# Patient Record
Sex: FEMALE | Race: WHITE | NOT HISPANIC OR LATINO | Employment: STUDENT | URBAN - METROPOLITAN AREA
[De-identification: names, ages, dates, MRNs, and addresses within clinical notes are randomized per-mention and may not be internally consistent; named-entity substitution may affect disease eponyms.]

---
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
## 2021-01-13 ENCOUNTER — TRANSCRIBE ORDERS (OUTPATIENT)
Dept: ADMINISTRATIVE | Facility: HOSPITAL | Age: 21
End: 2021-01-13

## 2021-01-13 DIAGNOSIS — M79.89 MASS OF SOFT TISSUE OF SHOULDER: Primary | ICD-10-CM

## 2021-01-15 ENCOUNTER — HOSPITAL ENCOUNTER (OUTPATIENT)
Dept: ULTRASOUND IMAGING | Facility: HOSPITAL | Age: 21
Discharge: HOME | End: 2021-01-15
Admitting: PEDIATRICS

## 2021-01-15 DIAGNOSIS — M79.89 MASS OF SOFT TISSUE OF SHOULDER: ICD-10-CM

## 2021-01-15 PROCEDURE — 76882 US LMTD JT/FCL EVL NVASC XTR: CPT

## 2022-12-28 ENCOUNTER — OFFICE VISIT (OUTPATIENT)
Dept: INTERNAL MEDICINE | Facility: CLINIC | Age: 22
End: 2022-12-28

## 2022-12-28 VITALS
DIASTOLIC BLOOD PRESSURE: 62 MMHG | HEART RATE: 112 BPM | OXYGEN SATURATION: 97 % | SYSTOLIC BLOOD PRESSURE: 106 MMHG | TEMPERATURE: 98 F | WEIGHT: 116 LBS | BODY MASS INDEX: 19.33 KG/M2 | HEIGHT: 65 IN

## 2022-12-28 DIAGNOSIS — R14.0 ABDOMINAL BLOATING: ICD-10-CM

## 2022-12-28 DIAGNOSIS — N92.0 MENORRHAGIA WITH REGULAR CYCLE: ICD-10-CM

## 2022-12-28 DIAGNOSIS — H72.91 PERFORATION OF RIGHT TYMPANIC MEMBRANE: ICD-10-CM

## 2022-12-28 DIAGNOSIS — K59.00 CONSTIPATION, UNSPECIFIED CONSTIPATION TYPE: ICD-10-CM

## 2022-12-28 DIAGNOSIS — N83.209 CYST OF OVARY, UNSPECIFIED LATERALITY: ICD-10-CM

## 2022-12-28 DIAGNOSIS — H93.19 TINNITUS, UNSPECIFIED LATERALITY: ICD-10-CM

## 2022-12-28 DIAGNOSIS — Z00.00 HEALTH CARE MAINTENANCE: Primary | ICD-10-CM

## 2022-12-28 PROCEDURE — 99204 OFFICE O/P NEW MOD 45 MIN: CPT | Performed by: NURSE PRACTITIONER

## 2022-12-28 RX ORDER — SERTRALINE HYDROCHLORIDE 100 MG/1
100 TABLET, FILM COATED ORAL DAILY
COMMUNITY
Start: 2022-12-11

## 2022-12-28 NOTE — PATIENT INSTRUCTIONS
1. Preventative counseling- continue to work on healthy diet and exercise  2. Ovarian cysts- discuss this and ?endometriosis with GYN.   3. Tinnitus/TM perforation- see ENT  4. Constipation/Abd bloating- check celiac panel, start probiotic and start GI     Fasting labs in 1-2 weeks  She will look to see who is in network in [place] for ENT and GI referrals.
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
## 2023-01-24 ENCOUNTER — TELEPHONE (OUTPATIENT)
Dept: INTERNAL MEDICINE | Facility: CLINIC | Age: 23
End: 2023-01-24

## 2023-01-24 NOTE — TELEPHONE ENCOUNTER
Caller: Mairja Hanson    Relationship: Self    Best call back number: [phone]    What specialty or service is being requested: GASTROENTEROLOGY, ENT    What is the provider, practice or medical service name: PATIENT STATED SHE HAS     Any additional details: PATIENT STATED SHE HAS DISCUSSED IN NETWORK PROVIDER WITH HER INSURANCE, AND HAS 3 NAMES FOR EACH SPECIALTY OF PROVIDERS  SHE COULD BE REFERRED TO.    PATIENT IS REQUESTING TO KNOW IF SHE SHOULD HAVE BLOODWORK DONE PRIOR TO MEETING WITH A SPECIALIST.    PATIENT IS REQUESTING A CALL TO DISCUSS THIS WITH MAKENNA CASH.    PLEASE CALL TO DISCUSS

## 2023-02-13 ENCOUNTER — TELEPHONE (OUTPATIENT)
Dept: INTERNAL MEDICINE | Facility: CLINIC | Age: 23
End: 2023-02-13
Payer: COMMERCIAL

## 2023-02-13 DIAGNOSIS — H93.19 TINNITUS, UNSPECIFIED LATERALITY: Primary | ICD-10-CM

## 2023-02-13 DIAGNOSIS — K59.00 CONSTIPATION, UNSPECIFIED CONSTIPATION TYPE: ICD-10-CM

## 2023-02-13 NOTE — TELEPHONE ENCOUNTER
I have sent both ref in ----- Message from ANNE Tan sent at 2/7/2023  5:09 PM EST -----  Regarding: RE: Referral  That's fine, please place referrals  ----- Message -----  From: Martha Keller  Sent: 2/7/2023   1:42 PM EST  To: ANNE Tan  Subject: Referral                                         Patient phoned to let you know who she wanted to be referred to ENT she would like to see Dr. Headley and for GI doctor Dr. Katherine Munguia those are who her insurance will pay for.

## 2023-03-03 ENCOUNTER — TELEPHONE (OUTPATIENT)
Dept: INTERNAL MEDICINE | Facility: CLINIC | Age: 23
End: 2023-03-03

## 2023-03-03 NOTE — TELEPHONE ENCOUNTER
Caller: Marija Hanson    Relationship: Self    Best call back number: [phone]     What is the medical concern/diagnosis: HOLE IN EAR DRUM    What specialty or service is being requested: ENT    What is the provider, practice or medical service name: ANY    What is the office location: ANY    What is the office phone number: ANY    Any additional details: PATIENT HAD A PREVIOUS REFERRAL, BUT THE PROVIDER'S OFFICE NO LONGER EXISTS.

## 2023-03-06 DIAGNOSIS — H93.19 TINNITUS, UNSPECIFIED LATERALITY: ICD-10-CM

## 2023-03-06 DIAGNOSIS — H72.91 PERFORATION OF RIGHT TYMPANIC MEMBRANE: Primary | ICD-10-CM

## 2023-05-15 ENCOUNTER — OFFICE VISIT (OUTPATIENT)
Dept: INTERNAL MEDICINE | Facility: CLINIC | Age: 23
End: 2023-05-15
Payer: COMMERCIAL

## 2023-05-15 ENCOUNTER — HOSPITAL ENCOUNTER (OUTPATIENT)
Dept: CT IMAGING | Facility: HOSPITAL | Age: 23
Discharge: HOME | End: 2023-05-15
Admitting: NURSE PRACTITIONER
Payer: COMMERCIAL

## 2023-05-15 VITALS
TEMPERATURE: 98 F | HEART RATE: 107 BPM | OXYGEN SATURATION: 100 % | SYSTOLIC BLOOD PRESSURE: 100 MMHG | BODY MASS INDEX: 19.16 KG/M2 | WEIGHT: 115 LBS | DIASTOLIC BLOOD PRESSURE: 76 MMHG | HEIGHT: 65 IN

## 2023-05-15 DIAGNOSIS — S06.0X0A CONCUSSION WITHOUT LOSS OF CONSCIOUSNESS, INITIAL ENCOUNTER: ICD-10-CM

## 2023-05-15 DIAGNOSIS — S06.0X0A CONCUSSION WITHOUT LOSS OF CONSCIOUSNESS, INITIAL ENCOUNTER: Primary | ICD-10-CM

## 2023-05-15 PROCEDURE — 70450 CT HEAD/BRAIN W/O DYE: CPT

## 2023-05-15 NOTE — PROGRESS NOTES
Subjective   Marija Hanson is a 23 y.o. female. Patient is here today for   Chief Complaint   Patient presents with   • Concussion     Patient complains of pain on top of head after falling    .    History of Present Illness   Patient hit her head 10 days ago. She tripped over her cat and hit her head on the sink. 3 days later, she was nauseous. She still has some nausea.   Hit the top of her head. She has a slight headache. She does have some trouble focusing on screens such as computer, phone.  Patient has had a concussion in the past.  She did not lose consciousness, but states that she was disoriented.    The following portions of the patient's history were reviewed and updated as appropriate: allergies, current medications, past family history, past medical history, past social history, past surgical history and problem list.    Review of Systems    Objective   Vitals:    05/15/23 0751   BP: 100/76   Pulse: 107   Temp: 98 °F (36.7 °C)   SpO2: 100%     Body mass index is 19.14 kg/m².  Physical Exam  Vitals and nursing note reviewed.   Constitutional:       Appearance: Normal appearance. She is well-developed.   HENT:      Right Ear: Tympanic membrane and ear canal normal.      Left Ear: Tympanic membrane and ear canal normal.   Eyes:      Pupils: Pupils are equal, round, and reactive to light.   Cardiovascular:      Rate and Rhythm: Normal rate and regular rhythm.      Heart sounds: Normal heart sounds.   Pulmonary:      Effort: Pulmonary effort is normal.      Breath sounds: Normal breath sounds.   Skin:     General: Skin is warm and dry.   Neurological:      Mental Status: She is alert and oriented to person, place, and time.   Psychiatric:         Speech: Speech normal.         Behavior: Behavior normal.         Thought Content: Thought content normal.         Assessment & Plan   Diagnoses and all orders for this visit:    1. Concussion without loss of consciousness, initial encounter (Primary)  -     CT  Head Without Contrast; Future      We will get a stat CT scan to rule out any bleed.  Patient lives in [place] and is here for just this week.  Apparently she drives down here for her health care due to her insurance.  Patient is also doing physical therapy for her neck and is needing to be released from her concussion to proceed with physical therapy.  States that she does physical therapy online

## 2023-06-06 DIAGNOSIS — K59.00 CONSTIPATION, UNSPECIFIED CONSTIPATION TYPE: ICD-10-CM

## 2023-06-06 DIAGNOSIS — R14.0 ABDOMINAL BLOATING: Primary | ICD-10-CM

## 2023-07-17 DIAGNOSIS — G56.03 CARPAL TUNNEL SYNDROME, BILATERAL UPPER LIMBS: Primary | ICD-10-CM

## 2023-07-17 DIAGNOSIS — G56.00 CARPAL TUNNEL SYNDROME: Primary | ICD-10-CM

## 2023-08-14 ENCOUNTER — OFFICE (AMBULATORY)
Dept: URBAN - METROPOLITAN AREA CLINIC 76 | Facility: CLINIC | Age: 23
End: 2023-08-14
Payer: COMMERCIAL

## 2023-08-14 VITALS
HEART RATE: 103 BPM | WEIGHT: 112 LBS | DIASTOLIC BLOOD PRESSURE: 57 MMHG | HEIGHT: 65 IN | SYSTOLIC BLOOD PRESSURE: 97 MMHG | OXYGEN SATURATION: 97 %

## 2023-08-14 DIAGNOSIS — K21.9 GASTRO-ESOPHAGEAL REFLUX DISEASE WITHOUT ESOPHAGITIS: ICD-10-CM

## 2023-08-14 DIAGNOSIS — R11.2 NAUSEA WITH VOMITING, UNSPECIFIED: ICD-10-CM

## 2023-08-14 PROCEDURE — 99204 OFFICE O/P NEW MOD 45 MIN: CPT | Performed by: INTERNAL MEDICINE

## 2023-08-14 RX ORDER — PANTOPRAZOLE SODIUM 40 MG/1
40 TABLET, DELAYED RELEASE ORAL
Qty: 90 | Refills: 4 | Status: ACTIVE
Start: 2023-08-14

## 2023-08-15 ENCOUNTER — HOSPITAL ENCOUNTER (OUTPATIENT)
Dept: NEUROLOGY | Age: 23
Discharge: HOME | End: 2023-08-15
Attending: ORTHOPAEDIC SURGERY

## 2023-08-15 DIAGNOSIS — G56.00 CARPAL TUNNEL SYNDROME: ICD-10-CM

## 2023-08-15 DIAGNOSIS — G56.00 CARPAL TUNNEL SYNDROME, UNSPECIFIED LATERALITY: ICD-10-CM

## 2023-08-15 PROCEDURE — 95886 MUSC TEST DONE W/N TEST COMP: CPT | Performed by: PSYCHIATRY & NEUROLOGY

## 2023-08-15 PROCEDURE — 95886 MUSC TEST DONE W/N TEST COMP: CPT

## 2023-08-15 PROCEDURE — 95913 NRV CNDJ TEST 13/> STUDIES: CPT

## 2023-08-15 PROCEDURE — 95913 NRV CNDJ TEST 13/> STUDIES: CPT | Performed by: PSYCHIATRY & NEUROLOGY

## 2023-08-18 ENCOUNTER — TELEPHONE (OUTPATIENT)
Dept: INTERNAL MEDICINE | Facility: CLINIC | Age: 23
End: 2023-08-18
Payer: COMMERCIAL

## 2023-08-18 NOTE — TELEPHONE ENCOUNTER
Called patient and let her know that I could not locate the allergy testing results in her chart. She stated that she does not remember where she had the testing done.

## 2023-08-18 NOTE — TELEPHONE ENCOUNTER
Caller: MATT MARTÍNEZ    Relationship: Mother    Best call back number: [phone]*    What was the call regarding: PATIENT'S MOTHER (NOT ON VERBAL), CALLING STATING THAT THE PATIENT HAS BEEN HAVING REFLUX AND NEEDS TO GET A COPY OF THE ALLERGY TESTING THAT MAKENNASASHA CASH HAD ORDERED FOR HER AROUND A YEAR AGO. MATT STATES THAT THE PATIENT LOOKED ON HER MYCHART AND WAS UNABLE TO LOCATE IT. THE PATENT NEEDS THE RESULTS TO TAKE TO HER GI SPECIALIST.    ADDRESS: [address] [place] [address]     PLEASE CALL PATIENT BACK TO ADVISE IF THIS CAN BE MAILED TO THE PATIENT ASAP, AS SHE HAS A SCHEDULED APPOINTMENT WITH HER GI SPECIALIST ON 8/29.  [phone], IF NO ANSWER, PLEASE LEAVE A DETAILED MESSAGE FOR THE PATIENT.    Is it okay if the provider responds through [employer]: NO

## 2023-08-22 NOTE — TELEPHONE ENCOUNTER
Called and informed patient that she was referred to ENT through us, not an allergist. Provided patient with the name of the provider that we referred her to.

## 2023-08-24 ENCOUNTER — TELEPHONE (OUTPATIENT)
Dept: INTERNAL MEDICINE | Facility: CLINIC | Age: 23
End: 2023-08-24
Payer: COMMERCIAL

## 2023-08-24 VITALS
OXYGEN SATURATION: 100 % | HEART RATE: 86 BPM | DIASTOLIC BLOOD PRESSURE: 61 MMHG | HEART RATE: 71 BPM | RESPIRATION RATE: 13 BRPM | SYSTOLIC BLOOD PRESSURE: 103 MMHG | DIASTOLIC BLOOD PRESSURE: 66 MMHG | RESPIRATION RATE: 18 BRPM | DIASTOLIC BLOOD PRESSURE: 59 MMHG | TEMPERATURE: 97.7 F | DIASTOLIC BLOOD PRESSURE: 68 MMHG | OXYGEN SATURATION: 95 % | OXYGEN SATURATION: 97 % | HEART RATE: 77 BPM | HEIGHT: 65 IN | SYSTOLIC BLOOD PRESSURE: 121 MMHG | SYSTOLIC BLOOD PRESSURE: 113 MMHG | DIASTOLIC BLOOD PRESSURE: 64 MMHG | RESPIRATION RATE: 15 BRPM | RESPIRATION RATE: 20 BRPM | HEART RATE: 82 BPM | WEIGHT: 107 LBS | HEART RATE: 61 BPM | SYSTOLIC BLOOD PRESSURE: 101 MMHG | HEART RATE: 104 BPM | OXYGEN SATURATION: 99 % | HEART RATE: 79 BPM | DIASTOLIC BLOOD PRESSURE: 81 MMHG | RESPIRATION RATE: 17 BRPM | HEART RATE: 78 BPM | SYSTOLIC BLOOD PRESSURE: 102 MMHG | SYSTOLIC BLOOD PRESSURE: 107 MMHG | SYSTOLIC BLOOD PRESSURE: 110 MMHG | RESPIRATION RATE: 19 BRPM | HEART RATE: 69 BPM

## 2023-08-24 NOTE — TELEPHONE ENCOUNTER
Caller: MATT    Relationship:     Best call back number: [phone]     What is the best time to reach you: ANY    Who are you requesting to speak with (clinical staff, provider,  specific staff member): CLINICAL STAFF    Do you know the name of the person who called: MATT ROLON    What was the call regarding: PATIENT MOTHER CALLED AND NEEDS DR CASH TO CALL HER DAUGHTER JANETH AT PHONE # [phone] NEED TO KNOW IF THERE ARE ANY ALLERGY BLOOD TEST RESULTS AVAILABLE MAYBE BEEN LAST YEAR.  SHE WANTED TO GET A COPY BEFORE NEXT TUESDAY 8/29/23.  PLEASE GIVE HER DAUGHTER A CALL.    Is it okay if the provider responds through [employer]:

## 2023-08-24 NOTE — TELEPHONE ENCOUNTER
Called patient and let her know that we do not have any allergy testing results on file for her. I also informed patient that there was a celiac blood test ordered in December, but it was never completed so we do not have any results for it.

## 2023-08-28 ENCOUNTER — TREATMENT (OUTPATIENT)
Dept: PHYSICAL THERAPY | Facility: CLINIC | Age: 23
End: 2023-08-28
Payer: COMMERCIAL

## 2023-08-28 DIAGNOSIS — M77.11 LATERAL EPICONDYLITIS OF BOTH ELBOWS: Primary | ICD-10-CM

## 2023-08-28 DIAGNOSIS — M77.12 LATERAL EPICONDYLITIS OF BOTH ELBOWS: Primary | ICD-10-CM

## 2023-08-28 PROCEDURE — 97162 PT EVAL MOD COMPLEX 30 MIN: CPT | Performed by: PHYSICAL THERAPIST

## 2023-08-28 PROCEDURE — 97530 THERAPEUTIC ACTIVITIES: CPT | Performed by: PHYSICAL THERAPIST

## 2023-08-28 PROCEDURE — 97110 THERAPEUTIC EXERCISES: CPT | Performed by: PHYSICAL THERAPIST

## 2023-08-28 NOTE — PROGRESS NOTES
Physical Therapy Initial Evaluation and Plan of Care        Patient: Marija Hanson   : 2000  Diagnosis/ICD-10 Code:  Lateral epicondylitis of both elbows [M77.11, M77.12]  Referring practitioner: ANNE Tan  Date of Initial Visit: 2023  Today's Date: 2023  Patient seen for 1 sessions           Subjective Questionnaire: [employer]: 75% disability.      Subjective Evaluation    History of Present Illness  Mechanism of injury: Marija is a 23 year old female who presents with bilateral elbow pain. She's been diagnosed with lateral epicondylitis on both arms. She lives in [place], her parents live here so she is only here briefly, she'd just like to get some recommendations for how to change her work station and some exercises for her pain. She had an EMG that was negative for any neuropathic pain. She struggles to  objects and has been dropping things    Pain  Current pain rating: 3  At worst pain ratin  Quality: dull ache  Aggravating factors: keyboarding, lifting, movement, outstretched reach, repetitive movement and overhead activity    Hand dominance: right    Treatments  Previous treatment: physical therapy  Current treatment: physical therapy  Patient Goals  Patient goals for therapy: increased strength, independence with ADLs/IADLs, decreased pain, increased motion and return to work           Objective          Palpation   Left   Hypertonic in the wrist flexors.   Tenderness of the wrist flexors.     Right   Hypertonic in the wrist flexors. Tenderness of the wrist flexors.     Tenderness     Left Elbow   Tenderness in the lateral epicondyle.     Right Elbow   Tenderness in the lateral epicondyle.     Left Wrist/Hand   Tenderness in the lateral epicondyle.     Right Wrist/Hand   Tenderness in the lateral epicondyle.     Strength/Myotome Testing     Left Wrist/Hand   Wrist extension: 4-  Wrist flexion: 4-  Radial deviation: 4-  Ulnar deviation: 4-     (2nd hand position)      Trial 1: 35 lbs    Trial 2: 40 lbs    Trial 3: 42 lbs    Average: 39 lbs    Right Wrist/Hand   Wrist extension: 4-  Wrist flexion: 4-  Radial deviation: 4-  Ulnar deviation: 4-     (2nd hand position)     Trial 1: 7 lbs    Trial 2: 5 lbs    Trial 3: 9 lbs    Average: 7 lbs        Assessment & Plan       Assessment  Impairments: abnormal muscle tone, abnormal or restricted ROM, activity intolerance, impaired physical strength, lacks appropriate home exercise program and pain with function   Functional limitations: carrying objects, lifting, pulling, pushing, uncomfortable because of pain, reaching behind back, reaching overhead and unable to perform repetitive tasks   Assessment details: Patient exhibits decreased wrist and elbow strength, poor  strength, decreased wrist ROM, tenderness to palpation of elbow and wrist structures. Patient would benefit from one visit of skilled therapy to address her functional limitations listed above and to formulate a HEP and help her modify her workstation to decrease her pain and prevent recurrence of symptoms.   Prognosis: good    Goals  Plan Goals: Short Term Goals (achieve by end of evaluation):  1. Patient will be I with HEP and educated in good posture and ergonomics to allow her to tolerate work activities with less pain. (MET)    Plan  Therapy options: will not be seen for skilled therapy services  Planned therapy interventions: home exercise program, strengthening, stretching, therapeutic activities, IADL retraining, flexibility, functional ROM exercises, fine motor coordination training, ADL retraining, motor coordination training and spinal/joint mobilization  Duration in visits: 1  Plan details: Patient is only seen for one visit due to living out of town and she only needs advice on work station ergonomics, activity modification and some exercises for her lateral epicondylitis.      Visit Diagnoses:    ICD-10-CM ICD-9-CM   1. Lateral epicondylitis of both  elbows  M77.11 726.32    M77.12        Timed:  Manual Therapy:         mins  54060;  Therapeutic Exercise:    10     mins  01035;     Neuromuscular Misa:        mins  22294;    Therapeutic Activity:     25     mins  55898;     Gait Training:           mins  23104;     Ultrasound:          mins  16776;    Electrical Stimulation:         mins  02338 ( );    Untimed:  Electrical Stimulation:         mins  56603 ( );  Mechanical Traction:         mins  98646;     Timed Treatment:   35   mins   Total Treatment:     55   mins    PT SIGNATURE: Vance Xie PT, DPT, [facility]  DATE TREATMENT INITIATED: 8/28/2023      Initial Certification  Certification Period: 11/26/2023  I certify that the therapy services are furnished while this patient is under my care.  The services outlined above are required by this patient, and will be reviewed every 90 days.     PHYSICIAN: Bette Rendon APRN      DATE:     Please sign and return via fax to [phone].. Thank you, [facility].

## 2023-08-29 ENCOUNTER — OFFICE VISIT (OUTPATIENT)
Dept: INTERNAL MEDICINE | Facility: CLINIC | Age: 23
End: 2023-08-29
Payer: COMMERCIAL

## 2023-08-29 ENCOUNTER — AMBULATORY SURGICAL CENTER (AMBULATORY)
Dept: URBAN - METROPOLITAN AREA SURGERY 17 | Facility: SURGERY | Age: 23
End: 2023-08-29
Payer: COMMERCIAL

## 2023-08-29 ENCOUNTER — OFFICE (AMBULATORY)
Dept: URBAN - METROPOLITAN AREA PATHOLOGY 4 | Facility: PATHOLOGY | Age: 23
End: 2023-08-29
Payer: COMMERCIAL

## 2023-08-29 VITALS
BODY MASS INDEX: 18.83 KG/M2 | HEART RATE: 98 BPM | DIASTOLIC BLOOD PRESSURE: 62 MMHG | OXYGEN SATURATION: 98 % | SYSTOLIC BLOOD PRESSURE: 102 MMHG | WEIGHT: 113 LBS | HEIGHT: 65 IN

## 2023-08-29 DIAGNOSIS — K21.9 GASTROESOPHAGEAL REFLUX DISEASE, UNSPECIFIED WHETHER ESOPHAGITIS PRESENT: ICD-10-CM

## 2023-08-29 DIAGNOSIS — K31.89 OTHER DISEASES OF STOMACH AND DUODENUM: ICD-10-CM

## 2023-08-29 DIAGNOSIS — K21.9 GASTRO-ESOPHAGEAL REFLUX DISEASE WITHOUT ESOPHAGITIS: ICD-10-CM

## 2023-08-29 DIAGNOSIS — F41.9 ANXIETY: ICD-10-CM

## 2023-08-29 DIAGNOSIS — R55 SYNCOPE, UNSPECIFIED SYNCOPE TYPE: ICD-10-CM

## 2023-08-29 DIAGNOSIS — K44.9 DIAPHRAGMATIC HERNIA WITHOUT OBSTRUCTION OR GANGRENE: ICD-10-CM

## 2023-08-29 DIAGNOSIS — R11.2 NAUSEA AND VOMITING, UNSPECIFIED VOMITING TYPE: ICD-10-CM

## 2023-08-29 DIAGNOSIS — R11.2 NAUSEA WITH VOMITING, UNSPECIFIED: ICD-10-CM

## 2023-08-29 DIAGNOSIS — R10.13 EPIGASTRIC PAIN: ICD-10-CM

## 2023-08-29 DIAGNOSIS — M24.9 HYPERMOBILE JOINTS: Primary | ICD-10-CM

## 2023-08-29 LAB
GI HISTOLOGY: A. UNSPECIFIED: (no result)
GI HISTOLOGY: B. UNSPECIFIED: (no result)
GI HISTOLOGY: C. SELECT: (no result)
GI HISTOLOGY: D. SELECT: (no result)
GI HISTOLOGY: PDF REPORT: (no result)

## 2023-08-29 PROCEDURE — 43239 EGD BIOPSY SINGLE/MULTIPLE: CPT | Performed by: INTERNAL MEDICINE

## 2023-08-29 PROCEDURE — 99214 OFFICE O/P EST MOD 30 MIN: CPT | Performed by: NURSE PRACTITIONER

## 2023-08-29 PROCEDURE — 88305 TISSUE EXAM BY PATHOLOGIST: CPT | Performed by: INTERNAL MEDICINE

## 2023-08-29 PROCEDURE — 88342 IMHCHEM/IMCYTCHM 1ST ANTB: CPT | Performed by: INTERNAL MEDICINE

## 2023-08-29 RX ORDER — BUSPIRONE HYDROCHLORIDE 5 MG/1
5 TABLET ORAL 3 TIMES DAILY
Qty: 90 TABLET | Refills: 5 | Status: SHIPPED | OUTPATIENT
Start: 2023-08-29 | End: 2023-11-20

## 2023-08-29 RX ORDER — ATOMOXETINE 18 MG/1
CAPSULE ORAL
COMMUNITY
Start: 2023-08-23 | End: 2023-11-20

## 2023-08-29 RX ORDER — DEXTROAMPHETAMINE SACCHARATE, AMPHETAMINE ASPARTATE, DEXTROAMPHETAMINE SULFATE AND AMPHETAMINE SULFATE 3.75; 3.75; 3.75; 3.75 MG/1; MG/1; MG/1; MG/1
15 TABLET ORAL DAILY
COMMUNITY
End: 2023-10-24

## 2023-08-29 RX ORDER — SERTRALINE HYDROCHLORIDE 25 MG/1
25 TABLET, FILM COATED ORAL DAILY
Qty: 30 TABLET | Refills: 5 | Status: SHIPPED | OUTPATIENT
Start: 2023-08-29

## 2023-08-29 RX ORDER — ONDANSETRON 4 MG/1
TABLET, FILM COATED ORAL
COMMUNITY
Start: 2023-08-04 | End: 2023-11-20

## 2023-08-29 RX ORDER — PANTOPRAZOLE SODIUM 40 MG/1
40 TABLET, DELAYED RELEASE ORAL EVERY MORNING
COMMUNITY
Start: 2023-08-14 | End: 2023-09-18 | Stop reason: SDUPTHER

## 2023-08-29 NOTE — PROGRESS NOTES
"Subjective   Marija Hanson is a 23 y.o. female.      History of Present Illness   The patient is here today to F/U on digestive issues. Used to be more constipation. Now with burping and feeling of choking. She does not have food get stuck. Feels her burps get stuck. She is also vomiting, some times multiple times a day.     She passed out 8/9th- she was at work and \"felt feeble\", she was found \"asleep on the floor\" She does not remember falling or passing out. She did re orient quickly.   She did go to the ER, her ethanol level was high. She reports she had not had alcohol for 18 hours prior. Had maybe 2-3 glasses of wine.   She has not had ETOH since the ER visit.     GI is planning gastric emptying scan, CT abd and pelvis.     Also in the ER about a week ago with dehydration.     \"I take a lot of naps.\"     Also had a concussion in May, tripped over her cat.     EGD today through Dr. Brian Oliveira- hiatal hernia, erythema in the antrum, Bx taken.      She has been off her psych meds for 2-3 weeks, makes her feel nauseated.     Since starting protonix she feels slight improvement. Trying to eat 3-5 small meals a day. Really trying to eat a low acid diet.     Ankle pain, rolling ankles, elbow pain, back and neck pain.     Seeing a hand specialist in [place], for tennis elbow, going to PT.     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   The following portions of the patient's history were reviewed and updated as appropriate: allergies, current medications, past family history, past medical history, past social history, past surgical history and problem list.    Review of Systems   Constitutional:  Positive for chills and diaphoresis (night sweats with deep sleep). Negative for fever.   Respiratory:  Positive for cough (with choking) and chest tightness (with stress).    Cardiovascular: Negative.    Gastrointestinal:  Positive for abdominal distention, abdominal pain, constipation, nausea, vomiting, GERD and indigestion. " Negative for anal bleeding, blood in stool and diarrhea.   Musculoskeletal:  Positive for arthralgias (low back pain, most days, better with back cracking).     Objective   Physical Exam  Constitutional:       Appearance: Normal appearance. She is well-developed.   Neck:      Thyroid: No thyromegaly.   Cardiovascular:      Rate and Rhythm: Normal rate and regular rhythm.      Heart sounds: Normal heart sounds.   Pulmonary:      Effort: Pulmonary effort is normal.      Breath sounds: Normal breath sounds.   Musculoskeletal:      Cervical back: Normal range of motion and neck supple.      Comments: Able to bring her thumb down to her wrist both sides.   Mild lumbar scoliosis to the right    Lymphadenopathy:      Cervical: No cervical adenopathy.   Skin:     General: Skin is warm and dry.   Neurological:      Mental Status: She is alert.   Psychiatric:         Behavior: Behavior normal.         Thought Content: Thought content normal.         Judgment: Judgment normal.       Vitals:    08/29/23 1259   BP: 102/62   Pulse: 98   SpO2: 98%     Body mass index is 18.8 kg/m².      Current Outpatient Medications:     ondansetron (ZOFRAN) 4 MG tablet, , Disp: , Rfl:     pantoprazole (PROTONIX) 40 MG EC tablet, Take 1 tablet by mouth Every Morning., Disp: , Rfl:     sertraline (ZOLOFT) 25 MG tablet, Take 1 tablet by mouth Daily., Disp: 30 tablet, Rfl: 5    amphetamine-dextroamphetamine (ADDERALL) 15 MG tablet, Take 1 tablet by mouth Daily. (Patient not taking: Reported on 8/29/2023), Disp: , Rfl:     atomoxetine (STRATTERA) 18 MG capsule, , Disp: , Rfl:     busPIRone (BUSPAR) 5 MG tablet, Take 1 tablet by mouth 3 (Three) Times a Day., Disp: 90 tablet, Rfl: 5   Assessment & Plan   Diagnoses and all orders for this visit:    1. Hypermobile joints (Primary)    2. Gastroesophageal reflux disease, unspecified whether esophagitis present    3. Nausea and vomiting, unspecified vomiting type    4. Anxiety  -     sertraline (ZOLOFT) 25  MG tablet; Take 1 tablet by mouth Daily.  Dispense: 30 tablet; Refill: 5  -     busPIRone (BUSPAR) 5 MG tablet; Take 1 tablet by mouth 3 (Three) Times a Day.  Dispense: 90 tablet; Refill: 5    5. Syncope, unspecified syncope type  -     Ambulatory Referral to Cardiology               1. Hypermobile joints- ?EDS, she will notify if referral is needed. Once further work up is completed should consider ECHO  2. GERD/ nausea/vomiting- ?GB/?gastroparesis, continue with GI (they will figure out [facility] and [place]) consider GB work up as well she will discuss with GI  Start ensure clear  3. Anxiety- she is still in therapy with EMDR, try buspar PRN, will also give Rx for zoloft at lower dose to restart when ready, if she needs a 90 day supply she will notify us and let us know what is working for her.   4. Syncope- ?POTS, needs cardiology eval.     She has FMLA and is using intermittently   Is considering [place] soon.   Needs letter for elevator access and no ETOH letter for cruise.   Needs PCP in [place] Answers submitted by the patient for this visit:  Primary Reason for Visit (Submitted on 8/29/2023)  What is the primary reason for your visit?: Physical

## 2023-08-29 NOTE — PATIENT INSTRUCTIONS
1. Hypermobile joints- ?EDS, she will notify if referral is needed. Once further work up is completed should consider ECHO  2. GERD/ nausea/vomiting- ?GB/?gastroparesis, continue with GI (they will figure out w [facility] and [place]) consider GB work up as well she will discuss with GI  Start ensure clear  3. Anxiety- she is still in therapy with EMDR, try buspar PRN, will also give Rx for zoloft at lower dose to restart when ready, if she needs a 90 day supply she will notify us and let us know what is working for her.   4. Syncope- ?POTS, needs cardiology eval.

## 2023-08-31 NOTE — PATIENT INSTRUCTIONS
Access Code: [email or web address]  URL: [email or web address]  Date: 08/31/2023  Prepared by: Vance Xie    Exercises  - Forearm Pronation and Supination with Hammer  - 1 x daily - 7 x weekly - 2-3 sets - 10 reps  - Seated Eccentric Wrist Extension  - 1 x daily - 7 x weekly - 2-3 sets - 10 reps  - Seated Wrist Flexion Stretch  - 1 x daily - 7 x weekly - 3 sets - 20 hold  - Wrist Extension with Resistance Bar  - 1 x daily - 7 x weekly - 2-3 sets - 10 reps    Access Code: [email or web address]  URL: [email or web address]  Date: 08/31/2023  Prepared by: Vance iXe    Patient Education  - Tips for Setting Up Your Home Workstation  - Setting Up Your Workstation: Keyboard  - Setting Up Your Workstation: Mouse  - Setting Up Your Workstation: Monitor  - Setting Up Your Workstation: Chair  - Desk Accessories to Increase Comfort

## 2023-09-18 RX ORDER — PANTOPRAZOLE SODIUM 40 MG/1
40 TABLET, DELAYED RELEASE ORAL EVERY MORNING
Qty: 30 TABLET | Refills: 0 | Status: SHIPPED | OUTPATIENT
Start: 2023-09-18 | End: 2023-09-18

## 2023-09-18 RX ORDER — PANTOPRAZOLE SODIUM 40 MG/1
40 TABLET, DELAYED RELEASE ORAL EVERY MORNING
Qty: 90 TABLET | Refills: 0 | Status: SHIPPED | OUTPATIENT
Start: 2023-09-18

## 2023-09-19 ENCOUNTER — APPOINTMENT (OUTPATIENT)
Dept: NEUROLOGY | Age: 23
End: 2023-09-19
Attending: ORTHOPAEDIC SURGERY

## 2023-09-27 ENCOUNTER — TELEPHONE (OUTPATIENT)
Dept: CARDIOLOGY | Facility: CLINIC | Age: 23
End: 2023-09-27

## 2023-09-27 DIAGNOSIS — Q79.60 EDS (EHLERS-DANLOS SYNDROME): Primary | ICD-10-CM

## 2023-09-27 NOTE — TELEPHONE ENCOUNTER
Caller: Marija Hanson    Relationship to patient: Self    Best call back number: [phone]    Chief complaint:     Type of visit: NEW PATIENT    Requested date: OCT 19-25     If rescheduling, when is the original appointment: 11.20.2023     Additional notes:PATIENT WAS TOLD THERE WERE 2-10.24 APPOINTMENTS AVAILABLE SHE IS REQUESTING TO BE RESCHEDULED TO THAT DATE. PLEASE CALL IF POSSIBLE

## 2023-10-23 PROBLEM — K31.89 OTHER DISEASES OF STOMACH AND DUODENUM: Status: ACTIVE | Noted: 2023-08-29

## 2023-10-24 ENCOUNTER — OFFICE VISIT (OUTPATIENT)
Dept: CARDIOLOGY | Facility: CLINIC | Age: 23
End: 2023-10-24
Payer: COMMERCIAL

## 2023-10-24 ENCOUNTER — TELEPHONE (OUTPATIENT)
Dept: CARDIOLOGY | Facility: CLINIC | Age: 23
End: 2023-10-24
Payer: COMMERCIAL

## 2023-10-24 ENCOUNTER — HOSPITAL ENCOUNTER (OUTPATIENT)
Dept: CARDIOLOGY | Facility: HOSPITAL | Age: 23
Discharge: HOME | End: 2023-10-24
Admitting: NURSE PRACTITIONER
Payer: COMMERCIAL

## 2023-10-24 VITALS
HEIGHT: 65 IN | HEART RATE: 85 BPM | OXYGEN SATURATION: 97 % | DIASTOLIC BLOOD PRESSURE: 68 MMHG | BODY MASS INDEX: 18.83 KG/M2 | SYSTOLIC BLOOD PRESSURE: 104 MMHG | WEIGHT: 113 LBS

## 2023-10-24 DIAGNOSIS — G90.A POTS (POSTURAL ORTHOSTATIC TACHYCARDIA SYNDROME): Primary | ICD-10-CM

## 2023-10-24 DIAGNOSIS — Q79.60 EDS (EHLERS-DANLOS SYNDROME): ICD-10-CM

## 2023-10-24 LAB
AORTIC ARCH: 2.3 CM
ASCENDING AORTA: 2.9 CM
BH CV ECHO MEAS - ACS: 2.11 CM
BH CV ECHO MEAS - AO MAX PG: 3.8 MMHG
BH CV ECHO MEAS - AO MEAN PG: 2.44 MMHG
BH CV ECHO MEAS - AO ROOT DIAM: 2.33 CM
BH CV ECHO MEAS - AO V2 MAX: 97.7 CM/SEC
BH CV ECHO MEAS - AO V2 VTI: 21.7 CM
BH CV ECHO MEAS - AVA(I,D): 2.36 CM2
BH CV ECHO MEAS - EDV(CUBED): 94.1 ML
BH CV ECHO MEAS - EDV(MOD-SP2): 62 ML
BH CV ECHO MEAS - EDV(MOD-SP4): 55 ML
BH CV ECHO MEAS - EF(MOD-BP): 67.4 %
BH CV ECHO MEAS - EF(MOD-SP2): 62.9 %
BH CV ECHO MEAS - EF(MOD-SP4): 70.9 %
BH CV ECHO MEAS - ESV(CUBED): 32.5 ML
BH CV ECHO MEAS - ESV(MOD-SP2): 23 ML
BH CV ECHO MEAS - ESV(MOD-SP4): 16 ML
BH CV ECHO MEAS - FS: 29.8 %
BH CV ECHO MEAS - IVS/LVPW: 0.93 CM
BH CV ECHO MEAS - IVSD: 0.6 CM
BH CV ECHO MEAS - LAT PEAK E' VEL: 16.2 CM/SEC
BH CV ECHO MEAS - LV MASS(C)D: 84.8 GRAMS
BH CV ECHO MEAS - LV MAX PG: 2.7 MMHG
BH CV ECHO MEAS - LV MEAN PG: 1.89 MMHG
BH CV ECHO MEAS - LV V1 MAX: 82.6 CM/SEC
BH CV ECHO MEAS - LV V1 VTI: 17.8 CM
BH CV ECHO MEAS - LVIDD: 4.5 CM
BH CV ECHO MEAS - LVIDS: 3.2 CM
BH CV ECHO MEAS - LVOT AREA: 2.9 CM2
BH CV ECHO MEAS - LVOT DIAM: 1.92 CM
BH CV ECHO MEAS - LVPWD: 0.65 CM
BH CV ECHO MEAS - MED PEAK E' VEL: 13.1 CM/SEC
BH CV ECHO MEAS - MV A DUR: 0.1 SEC
BH CV ECHO MEAS - MV A MAX VEL: 41.6 CM/SEC
BH CV ECHO MEAS - MV DEC SLOPE: 1012 CM/SEC2
BH CV ECHO MEAS - MV DEC TIME: 0.14 SEC
BH CV ECHO MEAS - MV E MAX VEL: 72.8 CM/SEC
BH CV ECHO MEAS - MV E/A: 1.75
BH CV ECHO MEAS - MV MAX PG: 4.1 MMHG
BH CV ECHO MEAS - MV MEAN PG: 2.21 MMHG
BH CV ECHO MEAS - MV P1/2T: 30 MSEC
BH CV ECHO MEAS - MV V2 VTI: 22.4 CM
BH CV ECHO MEAS - MVA(P1/2T): 7.3 CM2
BH CV ECHO MEAS - MVA(VTI): 2.29 CM2
BH CV ECHO MEAS - PA ACC TIME: 0.1 SEC
BH CV ECHO MEAS - PA V2 MAX: 76 CM/SEC
BH CV ECHO MEAS - PULM A REVS DUR: 0.09 SEC
BH CV ECHO MEAS - PULM A REVS VEL: 23.6 CM/SEC
BH CV ECHO MEAS - PULM DIAS VEL: 51 CM/SEC
BH CV ECHO MEAS - PULM S/D: 0.76
BH CV ECHO MEAS - PULM SYS VEL: 38.6 CM/SEC
BH CV ECHO MEAS - QP/QS: 0.64
BH CV ECHO MEAS - RAP SYSTOLE: 3 MMHG
BH CV ECHO MEAS - RV MAX PG: 1.81 MMHG
BH CV ECHO MEAS - RV V1 MAX: 67.3 CM/SEC
BH CV ECHO MEAS - RV V1 VTI: 16.2 CM
BH CV ECHO MEAS - RVOT DIAM: 1.6 CM
BH CV ECHO MEAS - RVSP: 25.1 MMHG
BH CV ECHO MEAS - SUP REN AO DIAM: 1.3 CM
BH CV ECHO MEAS - SV(LVOT): 51.3 ML
BH CV ECHO MEAS - SV(MOD-SP2): 39 ML
BH CV ECHO MEAS - SV(MOD-SP4): 39 ML
BH CV ECHO MEAS - SV(RVOT): 32.8 ML
BH CV ECHO MEAS - TAPSE (>1.6): 2.06 CM
BH CV ECHO MEAS - TR MAX PG: 22.1 MMHG
BH CV ECHO MEAS - TR MAX VEL: 234.8 CM/SEC
BH CV ECHO MEASUREMENTS AVERAGE E/E' RATIO: 4.97
BH CV XLRA - RV BASE: 2.8 CM
BH CV XLRA - RV LENGTH: 5.9 CM
BH CV XLRA - RV MID: 2.8 CM
BH CV XLRA - TDI S': 14.1 CM/SEC
LEFT ATRIUM VOLUME INDEX: 16.8 ML/M2
SINUS: 2.7 CM
STJ: 2.5 CM

## 2023-10-24 PROCEDURE — 93000 ELECTROCARDIOGRAM COMPLETE: CPT | Performed by: INTERNAL MEDICINE

## 2023-10-24 PROCEDURE — 99204 OFFICE O/P NEW MOD 45 MIN: CPT | Performed by: INTERNAL MEDICINE

## 2023-10-24 PROCEDURE — 93306 TTE W/DOPPLER COMPLETE: CPT | Performed by: INTERNAL MEDICINE

## 2023-10-24 PROCEDURE — 93306 TTE W/DOPPLER COMPLETE: CPT

## 2023-10-24 NOTE — TELEPHONE ENCOUNTER
Reviewed recommendations with Marija Hanson and the patient verbalized understanding of the recommendations.    Dr. Topete,    Patient is asking if any further testing is needed? Or does she just need to keep yearly f/u?    Thank you,  Brianda ALCANTARA RN  Triage Nurse [facility] [place]   15:26 EDT

## 2023-10-24 NOTE — TELEPHONE ENCOUNTER
----- Message from Quentin Topete MD sent at 10/24/2023 12:32 PM EDT -----  Can you please call and let Marija echocardiogram is essentially normal.  She needs to keep herself hydrated very well every day and be careful when changing position like lying or sitting to standing-avoid quick changes.  If she feels lightheaded or palpitations she needs to sit down or lie down.  Encourage strings exercise of lower extremities  Follow-up with provider in [place].  Let me know if she has any questions.  Thank you

## 2023-10-24 NOTE — TELEPHONE ENCOUNTER
Left voicemail for Marija Hanson requesting callback.    Triage: clarified message with Dr. Topete.  Dr. Topete wants patient to engage in strengthening exercises for her lower extremities, but please make it clear that he does not want the patient to lose weight.  He just wants her to strengthen her leg muscles.    Thank you,  Brianda ALCANTARA RN  Triage Nurse [facility] [place]   14:28 EDT

## 2023-10-24 NOTE — PROGRESS NOTES
PATIENTINFORMATION    Date of Office Visit: 10/24/2023  Encounter Provider: Quentin Topete MD  Place of Service: [facility] CARDIOLOGY  Patient Name: Marija Hanson  : 2000    Subjective:     Encounter Date:10/24/2023      Patient ID: Marija Hanson is a 23 y.o. female.    No chief complaint on file.    HPI  Ms. Hanson is a 23 years old young lady who currently lives in [place] came to cardiac clinic for evaluation of POTS and cardiac evaluation for recently diagnosed Hafsa Danlos syndrome.  She was noted to have hyperflexible wrist joint that prompted diagnosis of EDS.  She has had intermittent episodes of lightheadedness and palpitations especially when she gets up from lying or sitting position.  She had an episode of fainting last summer but also during ER evaluation she had significantly elevated ethanol level in blood.   Symptoms occur intermittently few times a week.  She denies any rest or exertional chest pain.  No family history of significant heart disease.  She has been abstaining from alcohol.  She denies recreational drug .      No prior echocardiogram.      ROS  All systems reviewed and negative except as noted in HPI.    Past Medical History:   Diagnosis Date    Allergies     Anorexia nervosa with bulimia     Anxiety     Depression     Headache     Menstrual abnormality     Syncope        Past Surgical History:   Procedure Laterality Date    EAR TUBES      WISDOM TOOTH EXTRACTION         Social History     Socioeconomic History    Marital status: Significant Other    Number of children: 0   Tobacco Use    Smoking status: Never    Smokeless tobacco: Never   Vaping Use    Vaping Use: Never used   Substance and Sexual Activity    Alcohol use: Yes     Comment: socially 3-5 a week    Drug use: Yes     Types: Marijuana     Comment: gummies- on the weekends, 1 2.5-5 mg    Sexual activity: Yes     Partners: Male     Birth control/protection: Nexplanon       Family History  "  Problem Relation Age of Onset    Anxiety disorder Mother     Cancer Mother         endometrial    Hypertension Mother     Hyperlipidemia Father     Depression Brother     Anxiety disorder Brother     Asthma Brother     Asthma Brother     Brain cancer Maternal Grandmother     Diabetes Maternal Grandfather     Kidney disease Maternal Grandfather     Glaucoma Maternal Grandfather     Skin cancer Paternal Grandmother     Stroke Paternal Grandfather            ECG 12 Lead    Date/Time: 10/24/2023 9:21 AM  Performed by: Quentin Topete MD    Authorized by: Quentin Topete MD  Comparison: not compared with previous ECG   Rhythm: sinus rhythm  Rate: normal  Conduction: conduction normal  ST Segments: ST segments normal  T Waves: T waves normal  QRS axis: normal  Other: no other findings    Clinical impression: normal ECG           Objective:     /68 (BP Location: Right arm, Patient Position: Sitting, Cuff Size: Adult)   Pulse 85   Ht 165.1 cm (65\")   Wt 51.3 kg (113 lb)   SpO2 97%   BMI 18.80 kg/m²  Body mass index is 18.8 kg/m².     Constitutional:       General: Not in acute distress.     Appearance: Well-developed. Not diaphoretic.   Eyes:      Pupils: Pupils are equal, round, and reactive to light.   HENT:      Head: Normocephalic and atraumatic.   Neck:      Thyroid: No thyromegaly.   Pulmonary:      Effort: Pulmonary effort is normal. No respiratory distress.      Breath sounds: Normal breath sounds. No wheezing. No rales.   Chest:      Chest wall: Not tender to palpatation.   Cardiovascular:      Normal rate. Regular rhythm.      No gallop.    Pulses:     Intact distal pulses.   Edema:     Peripheral edema absent.   Abdominal:      General: Bowel sounds are normal. There is no distension.      Palpations: Abdomen is soft.      Tenderness: There is no guarding.   Musculoskeletal: Normal range of motion.         General: No deformity.      Cervical back: Normal range of motion and neck supple. " Skin:     General: Skin is warm and dry.      Findings: No rash.   Neurological:      Mental Status: Alert and oriented to person, place, and time.      Cranial Nerves: No cranial nerve deficit.      Deep Tendon Reflexes: Reflexes are normal and symmetric.   Psychiatric:         Judgment: Judgment normal.       Review Of Data: I have reviewed pertinent recent labs, images and documents and pertinent findings included in HPI or assessment below.          Assessment/Plan:         Intermittent POTS  Hafsa Danlos syndrome-bilateral ankle brace.  Alcohol use-in ER twice in August with acute alcohol intoxication.  GERD    Echocardiogram done in office today.  Normal echocardiogram.  Encourage patient to keep herself hydrated very well.  Encouraged strength exercises of lower extremities  Orthostatic precautions when changing position    She will follow-up with a provider in [place]-currently visiting her mother in [place].    Diagnosis and plan of care discussed with patient and verbalized understanding.            Your medication list            Accurate as of October 24, 2023 12:31 PM. If you have any questions, ask your nurse or doctor.                CONTINUE taking these medications        Instructions Last Dose Given Next Dose Due   atomoxetine 18 MG capsule  Commonly known as: STRATTERA           busPIRone 5 MG tablet  Commonly known as: BUSPAR      Take 1 tablet by mouth 3 (Three) Times a Day.       ondansetron 4 MG tablet  Commonly known as: ZOFRAN           pantoprazole 40 MG EC tablet  Commonly known as: PROTONIX      TAKE 1 TABLET BY MOUTH EVERY MORNING       sertraline 25 MG tablet  Commonly known as: ZOLOFT      Take 1 tablet by mouth Daily.                    Quentin Topete MD  10/24/23  12:31 EDT

## 2023-10-25 NOTE — TELEPHONE ENCOUNTER
Called Marija Hanson to review recommendations, however there was no answer and the voicemail box is full.  Sent SMS message with office phone number.    Will attempt to call again at a later time.    Thank you,  Brianda ALCANTARA RN  Triage Nurse [facility]   12:36 EDT

## 2023-10-26 NOTE — TELEPHONE ENCOUNTER
Reviewed recommendations with patient, verbalized understanding, will call with any further questions or complaints.    Shanelle Higginbotham RN  Triage Nurse  10/26/23 11:27 EDT

## 2023-11-08 ENCOUNTER — TELEPHONE (OUTPATIENT)
Dept: PEDIATRICS | Facility: OTHER | Age: 23
End: 2023-11-08

## 2023-11-08 NOTE — TELEPHONE ENCOUNTER
Caller: Marija Hanson    Relationship: Self    Best call back number: [phone]     What orders are you requesting (i.e. lab or imaging): [facility] BLOOD TEST - MAST CELL ACTIVATION    In what timeframe would the patient need to come in: IN OFFICE    Where will you receive your lab/imaging services: NEXT AVAILABLE    Additional notes: PLEASE ADVISE

## 2023-11-08 NOTE — TELEPHONE ENCOUNTER
Caller: Marija Hanson    Relationship: Self    Best call back number: [phone]     What orders are you requesting (i.e. lab or imaging): PRYPTASE    Where will you receive your lab/imaging services: IN OFFICE    Additional notes: PATIENT STATES THE PRYPTASE TEST IS A [facility] TEST.

## 2023-11-10 ENCOUNTER — TELEPHONE (OUTPATIENT)
Dept: INTERNAL MEDICINE | Facility: CLINIC | Age: 23
End: 2023-11-10

## 2023-11-10 NOTE — TELEPHONE ENCOUNTER
Caller: Marija Hanson    Relationship: Self      What is the best time to reach you: ANYTIME     Who are you requesting to speak with (clinical staff, provider,  specific staff member): CLINICAL STAFF     What was the call regarding: PATIENT IS CALLING TO INFORM THE OFFICE THAT THE INSURANCE COMPANY IS SENDING A NETWORK DEFICIENCY CLAIM TO THE OFFICE VIA FAX AND SHE WOULD LIKE THAT FILLED OUT AND SENT BACK.

## 2023-11-14 RX ORDER — PANTOPRAZOLE SODIUM 40 MG/1
40 TABLET, DELAYED RELEASE ORAL EVERY MORNING
Qty: 90 TABLET | Refills: 0 | Status: SHIPPED | OUTPATIENT
Start: 2023-11-14

## 2023-11-20 VITALS
HEIGHT: 65 IN | WEIGHT: 114 LBS | OXYGEN SATURATION: 98 % | HEART RATE: 104 BPM | DIASTOLIC BLOOD PRESSURE: 76 MMHG | SYSTOLIC BLOOD PRESSURE: 128 MMHG

## 2023-11-21 ENCOUNTER — OFFICE (AMBULATORY)
Dept: URBAN - METROPOLITAN AREA CLINIC 76 | Facility: CLINIC | Age: 23
End: 2023-11-21
Payer: COMMERCIAL

## 2023-11-21 DIAGNOSIS — R11.2 NAUSEA WITH VOMITING, UNSPECIFIED: ICD-10-CM

## 2023-11-21 DIAGNOSIS — R10.13 EPIGASTRIC PAIN: ICD-10-CM

## 2023-11-21 DIAGNOSIS — K21.9 GASTRO-ESOPHAGEAL REFLUX DISEASE WITHOUT ESOPHAGITIS: ICD-10-CM

## 2023-11-21 PROCEDURE — 99214 OFFICE O/P EST MOD 30 MIN: CPT | Performed by: INTERNAL MEDICINE

## 2023-11-21 RX ORDER — PANTOPRAZOLE SODIUM 40 MG/1
40 TABLET, DELAYED RELEASE ORAL
Qty: 90 | Refills: 4 | Status: ACTIVE
Start: 2023-08-14

## 2023-11-28 ENCOUNTER — TELEPHONE (OUTPATIENT)
Dept: INTERNAL MEDICINE | Facility: CLINIC | Age: 23
End: 2023-11-28

## 2023-11-28 NOTE — TELEPHONE ENCOUNTER
Caller: KAREN    Relationship: Emergency Contact    Best call back number: [phone]    What medication are you requesting: RASH MEDS OR ADVISE / NO AVAILABLE APPTS TODAY AND PATIENT IS FLYING OUT TOMORROW     What are your current symptoms: RASH THAT IS SPREADING , AND ITCHY     How long have you been experiencing symptoms:     Have you had these symptoms before:    [] Yes  [] No    Have you been treated for these symptoms before:   [] Yes  [] No    If a prescription is needed, what is your preferred pharmacy and phone number: [id number] [address] [facility] - [phone] [facility] [phone] FX     Additional notes:

## 2023-11-28 NOTE — TELEPHONE ENCOUNTER
Caller: KAREN    Relationship: Emergency Contact    Best call back number: [phone]     MOTHER STATES THAT THE PATIENT'S ALLERGIST CALLED IN SOMETHING FOR HER.

## 2023-11-29 ENCOUNTER — HOSPITAL ENCOUNTER (EMERGENCY)
Facility: HOSPITAL | Age: 23
Discharge: HOME | End: 2023-11-29
Attending: EMERGENCY MEDICINE | Admitting: EMERGENCY MEDICINE
Payer: COMMERCIAL

## 2023-11-29 VITALS
TEMPERATURE: 97.9 F | SYSTOLIC BLOOD PRESSURE: 110 MMHG | HEIGHT: 65 IN | HEART RATE: 90 BPM | RESPIRATION RATE: 16 BRPM | BODY MASS INDEX: 18.83 KG/M2 | OXYGEN SATURATION: 93 % | DIASTOLIC BLOOD PRESSURE: 76 MMHG | WEIGHT: 113 LBS

## 2023-11-29 DIAGNOSIS — R55 SYNCOPE, UNSPECIFIED SYNCOPE TYPE: Primary | ICD-10-CM

## 2023-11-29 DIAGNOSIS — Z87.898 HISTORY OF ALCOHOL USE: ICD-10-CM

## 2023-11-29 DIAGNOSIS — Z87.898 HISTORY OF SYNCOPE: ICD-10-CM

## 2023-11-29 DIAGNOSIS — F10.920 ALCOHOLIC INTOXICATION WITHOUT COMPLICATION: ICD-10-CM

## 2023-11-29 LAB
ALBUMIN SERPL-MCNC: 4.4 G/DL (ref 3.5–5.2)
ALBUMIN/GLOB SERPL: 1.8 G/DL
ALP SERPL-CCNC: 76 U/L (ref 39–117)
ALT SERPL W P-5'-P-CCNC: 143 U/L (ref 1–33)
AMPHET+METHAMPHET UR QL: NEGATIVE
ANION GAP SERPL CALCULATED.3IONS-SCNC: 12 MMOL/L (ref 5–15)
AST SERPL-CCNC: 148 U/L (ref 1–32)
BARBITURATES UR QL SCN: NEGATIVE
BASOPHILS # BLD AUTO: 0.06 10*3/MM3 (ref 0–0.2)
BASOPHILS NFR BLD AUTO: 1.2 % (ref 0–1.5)
BENZODIAZ UR QL SCN: NEGATIVE
BILIRUB SERPL-MCNC: 0.2 MG/DL (ref 0–1.2)
BILIRUB UR QL STRIP: NEGATIVE
BUN SERPL-MCNC: 6 MG/DL (ref 6–20)
BUN/CREAT SERPL: 8.7 (ref 7–25)
CALCIUM SPEC-SCNC: 8.6 MG/DL (ref 8.6–10.5)
CANNABINOIDS SERPL QL: NEGATIVE
CHLORIDE SERPL-SCNC: 106 MMOL/L (ref 98–107)
CLARITY UR: CLEAR
CO2 SERPL-SCNC: 27 MMOL/L (ref 22–29)
COCAINE UR QL: NEGATIVE
COLOR UR: YELLOW
CREAT SERPL-MCNC: 0.69 MG/DL (ref 0.57–1)
DEPRECATED RDW RBC AUTO: 51.8 FL (ref 37–54)
EGFRCR SERPLBLD CKD-EPI 2021: 126 ML/MIN/1.73
EOSINOPHIL # BLD AUTO: 0.12 10*3/MM3 (ref 0–0.4)
EOSINOPHIL NFR BLD AUTO: 2.4 % (ref 0.3–6.2)
ERYTHROCYTE [DISTWIDTH] IN BLOOD BY AUTOMATED COUNT: 14.1 % (ref 12.3–15.4)
ETHANOL BLD-MCNC: 453 MG/DL (ref 0–10)
ETHANOL UR QL: 0.45 %
FENTANYL UR-MCNC: NEGATIVE NG/ML
GLOBULIN UR ELPH-MCNC: 2.4 GM/DL
GLUCOSE SERPL-MCNC: 90 MG/DL (ref 65–99)
GLUCOSE UR STRIP-MCNC: NEGATIVE MG/DL
HCG SERPL QL: NEGATIVE
HCT VFR BLD AUTO: 38 % (ref 34–46.6)
HGB BLD-MCNC: 12.8 G/DL (ref 12–15.9)
HGB UR QL STRIP.AUTO: NEGATIVE
IMM GRANULOCYTES # BLD AUTO: 0.01 10*3/MM3 (ref 0–0.05)
IMM GRANULOCYTES NFR BLD AUTO: 0.2 % (ref 0–0.5)
KETONES UR QL STRIP: NEGATIVE
LEUKOCYTE ESTERASE UR QL STRIP.AUTO: NEGATIVE
LYMPHOCYTES # BLD AUTO: 1.8 10*3/MM3 (ref 0.7–3.1)
LYMPHOCYTES NFR BLD AUTO: 35.4 % (ref 19.6–45.3)
MAGNESIUM SERPL-MCNC: 2.2 MG/DL (ref 1.6–2.6)
MCH RBC QN AUTO: 33.7 PG (ref 26.6–33)
MCHC RBC AUTO-ENTMCNC: 33.7 G/DL (ref 31.5–35.7)
MCV RBC AUTO: 100 FL (ref 79–97)
METHADONE UR QL SCN: NEGATIVE
MONOCYTES # BLD AUTO: 0.24 10*3/MM3 (ref 0.1–0.9)
MONOCYTES NFR BLD AUTO: 4.7 % (ref 5–12)
NEUTROPHILS NFR BLD AUTO: 2.85 10*3/MM3 (ref 1.7–7)
NEUTROPHILS NFR BLD AUTO: 56.1 % (ref 42.7–76)
NITRITE UR QL STRIP: NEGATIVE
NRBC BLD AUTO-RTO: 0 /100 WBC (ref 0–0.2)
OPIATES UR QL: NEGATIVE
OXYCODONE UR QL SCN: NEGATIVE
PH UR STRIP.AUTO: 7.5 [PH] (ref 5–8)
PLATELET # BLD AUTO: 289 10*3/MM3 (ref 140–450)
PMV BLD AUTO: 9.1 FL (ref 6–12)
POTASSIUM SERPL-SCNC: 3.9 MMOL/L (ref 3.5–5.2)
PROT SERPL-MCNC: 6.8 G/DL (ref 6–8.5)
PROT UR QL STRIP: NEGATIVE
RBC # BLD AUTO: 3.8 10*6/MM3 (ref 3.77–5.28)
SODIUM SERPL-SCNC: 145 MMOL/L (ref 136–145)
SP GR UR STRIP: 1.01 (ref 1–1.03)
UROBILINOGEN UR QL STRIP: NORMAL
WBC NRBC COR # BLD AUTO: 5.08 10*3/MM3 (ref 3.4–10.8)

## 2023-11-29 PROCEDURE — 25810000003 LACTATED RINGERS SOLUTION: Performed by: EMERGENCY MEDICINE

## 2023-11-29 PROCEDURE — 84703 CHORIONIC GONADOTROPIN ASSAY: CPT | Performed by: EMERGENCY MEDICINE

## 2023-11-29 PROCEDURE — 80307 DRUG TEST PRSMV CHEM ANLYZR: CPT | Performed by: EMERGENCY MEDICINE

## 2023-11-29 PROCEDURE — 93005 ELECTROCARDIOGRAM TRACING: CPT | Performed by: EMERGENCY MEDICINE

## 2023-11-29 PROCEDURE — 82077 ASSAY SPEC XCP UR&BREATH IA: CPT | Performed by: EMERGENCY MEDICINE

## 2023-11-29 PROCEDURE — 81003 URINALYSIS AUTO W/O SCOPE: CPT | Performed by: EMERGENCY MEDICINE

## 2023-11-29 PROCEDURE — 99283 EMERGENCY DEPT VISIT LOW MDM: CPT

## 2023-11-29 PROCEDURE — 80053 COMPREHEN METABOLIC PANEL: CPT | Performed by: EMERGENCY MEDICINE

## 2023-11-29 PROCEDURE — 83735 ASSAY OF MAGNESIUM: CPT | Performed by: EMERGENCY MEDICINE

## 2023-11-29 PROCEDURE — 85025 COMPLETE CBC W/AUTO DIFF WBC: CPT | Performed by: EMERGENCY MEDICINE

## 2023-11-29 RX ADMIN — SODIUM CHLORIDE, POTASSIUM CHLORIDE, SODIUM LACTATE AND CALCIUM CHLORIDE 1000 ML: 600; 310; 30; 20 INJECTION, SOLUTION INTRAVENOUS at 13:41

## 2023-11-29 NOTE — DISCHARGE INSTRUCTIONS
Follow-up with your PCP and other medical teams as discussed, continue current medications, ED return for worsening symptoms as needed.

## 2023-11-29 NOTE — ED PROVIDER NOTES
EMERGENCY DEPARTMENT ENCOUNTER    Room Number:  39/39  PCP: Bette Rendon APRN  Historian: Patient      HPI:  Chief Complaint: Syncope  A complete HPI/ROS/PMH/PSH/SH/FH are unobtainable due to: None    Context: Marija Hanson is a 23 y.o. female who presents to the ED via EMS from the airport where she was attempting to fly home after being home for the holidays.  Had a syncopal episode.  History of POTS.  Reports passing out frequently.  EMS reported that she smelled of alcohol but the patient denied any use.  Patient has chronic GI issues with nausea, vomit, diarrhea unchanged from baseline.  Reports underlying depression and does follow with psychiatry and psychology.  Denies any current SI or HI, no thoughts of self-harm.      MEDICAL RECORD REVIEW    External (non-ED) record review: Chart review in epic shows an ER visit August 24, 2023 at [facility] in [place], [place], patient was seen for heat exhaustion but ultimately was found to have alcohol intoxication with alcohol level of 454    PAST MEDICAL HISTORY  Active Ambulatory Problems     Diagnosis Date Noted    Concussion 10/29/2013    Hiatal hernia 08/29/2023    Anxiety 08/29/2023    POTS (postural orthostatic tachycardia syndrome) 10/24/2023    EDS (Hafsa-Danlos syndrome) 10/24/2023     Resolved Ambulatory Problems     Diagnosis Date Noted    No Resolved Ambulatory Problems     Past Medical History:   Diagnosis Date    Allergies     Anorexia nervosa with bulimia     Depression     Headache     Menstrual abnormality     Syncope          PAST SURGICAL HISTORY  Past Surgical History:   Procedure Laterality Date    EAR TUBES      WISDOM TOOTH EXTRACTION           FAMILY HISTORY  Family History   Problem Relation Age of Onset    Anxiety disorder Mother     Cancer Mother         endometrial    Hypertension Mother     Hyperlipidemia Father     Depression Brother     Anxiety disorder Brother     Asthma Brother     Asthma Brother     Brain  Bedside and Verbal shift change report given to Paige Chávez RN (oncoming nurse) by Loyd William RN (offgoing nurse). Report included the following information SBAR, Kardex, Procedure Summary, Intake/Output, MAR, Accordion and Recent Results. cancer Maternal Grandmother     Diabetes Maternal Grandfather     Kidney disease Maternal Grandfather     Glaucoma Maternal Grandfather     Skin cancer Paternal Grandmother     Stroke Paternal Grandfather          SOCIAL HISTORY  Social History     Socioeconomic History    Marital status: Significant Other    Number of children: 0   Tobacco Use    Smoking status: Never    Smokeless tobacco: Never   Vaping Use    Vaping Use: Never used   Substance and Sexual Activity    Alcohol use: Yes     Comment: socially 3-5 a week    Drug use: Yes     Types: Marijuana     Comment: gummies- on the weekends, 1 2.5-5 mg    Sexual activity: Yes     Partners: Male     Birth control/protection: Nexplanon         ALLERGIES  Peanut-containing drug products and Amoxicillin        REVIEW OF SYSTEMS  Review of Systems     All systems reviewed and negative except for those discussed in HPI.       PHYSICAL EXAM    I have reviewed the triage vital signs and nursing notes.    ED Triage Vitals [11/29/23 1312]   Temp Heart Rate Resp BP SpO2   97.9 °F (36.6 °C) 74 18 113/80 97 %      Temp src Heart Rate Source Patient Position BP Location FiO2 (%)   Oral Monitor Lying Right arm --       Physical Exam  General: Mildly uncomfortable, nontoxic, nondiaphoretic  HEENT: Mucous membranes moist, atraumatic, EOMI  Neck: Full ROM  Pulm: Symmetric chest rise, nonlabored, lungs CTAB  Cardiovascular: Borderline mild regular rhythm tachycardia, intact distal pulses  GI: Soft, nontender, nondistended, no rebound, no guarding, bowel sounds present  MSK: Full ROM, no deformity  Skin: Warm, dry  Neuro: Awake, alert, oriented x 4, GCS 15, slightly slowed speech, moving all extremities, no focal deficits  Psych: Calm, cooperative        LAB RESULTS  Recent Results (from the past 24 hour(s))   ECG 12 Lead Syncope    Collection Time: 11/29/23  1:26 PM   Result Value Ref Range    QT Interval 404 ms    QTC Interval 492 ms   hCG, Serum, Qualitative    Collection Time:  11/29/23  1:41 PM    Specimen: Blood   Result Value Ref Range    HCG Qualitative Negative Negative   Ethanol    Collection Time: 11/29/23  1:41 PM    Specimen: Blood   Result Value Ref Range    Ethanol 453 (H) 0 - 10 mg/dL    Ethanol % 0.453 %   Magnesium    Collection Time: 11/29/23  1:41 PM    Specimen: Blood   Result Value Ref Range    Magnesium 2.2 1.6 - 2.6 mg/dL   CBC Auto Differential    Collection Time: 11/29/23  1:41 PM    Specimen: Blood   Result Value Ref Range    WBC 5.08 3.40 - 10.80 10*3/mm3    RBC 3.80 3.77 - 5.28 10*6/mm3    Hemoglobin 12.8 12.0 - 15.9 g/dL    Hematocrit 38.0 34.0 - 46.6 %    .0 (H) 79.0 - 97.0 fL    MCH 33.7 (H) 26.6 - 33.0 pg    MCHC 33.7 31.5 - 35.7 g/dL    RDW 14.1 12.3 - 15.4 %    RDW-SD 51.8 37.0 - 54.0 fl    MPV 9.1 6.0 - 12.0 fL    Platelets 289 140 - 450 10*3/mm3    Neutrophil % 56.1 42.7 - 76.0 %    Lymphocyte % 35.4 19.6 - 45.3 %    Monocyte % 4.7 (L) 5.0 - 12.0 %    Eosinophil % 2.4 0.3 - 6.2 %    Basophil % 1.2 0.0 - 1.5 %    Immature Grans % 0.2 0.0 - 0.5 %    Neutrophils, Absolute 2.85 1.70 - 7.00 10*3/mm3    Lymphocytes, Absolute 1.80 0.70 - 3.10 10*3/mm3    Monocytes, Absolute 0.24 0.10 - 0.90 10*3/mm3    Eosinophils, Absolute 0.12 0.00 - 0.40 10*3/mm3    Basophils, Absolute 0.06 0.00 - 0.20 10*3/mm3    Immature Grans, Absolute 0.01 0.00 - 0.05 10*3/mm3    nRBC 0.0 0.0 - 0.2 /100 WBC   Comprehensive Metabolic Panel    Collection Time: 11/29/23  1:41 PM    Specimen: Blood   Result Value Ref Range    Glucose 90 65 - 99 mg/dL    BUN 6 6 - 20 mg/dL    Creatinine 0.69 0.57 - 1.00 mg/dL    Sodium 145 136 - 145 mmol/L    Potassium 3.9 3.5 - 5.2 mmol/L    Chloride 106 98 - 107 mmol/L    CO2 27.0 22.0 - 29.0 mmol/L    Calcium 8.6 8.6 - 10.5 mg/dL    Total Protein 6.8 6.0 - 8.5 g/dL    Albumin 4.4 3.5 - 5.2 g/dL    ALT (SGPT) 143 (H) 1 - 33 U/L    AST (SGOT) 148 (H) 1 - 32 U/L    Alkaline Phosphatase 76 39 - 117 U/L    Total Bilirubin 0.2 0.0 - 1.2 mg/dL    Globulin 2.4  gm/dL    A/G Ratio 1.8 g/dL    BUN/Creatinine Ratio 8.7 7.0 - 25.0    Anion Gap 12.0 5.0 - 15.0 mmol/L    eGFR 126.0 >60.0 mL/min/1.73   Urinalysis With Microscopic If Indicated (No Culture) - Urine, Clean Catch    Collection Time: 11/29/23  2:38 PM    Specimen: Urine, Clean Catch   Result Value Ref Range    Color, UA Yellow Yellow, Straw    Appearance, UA Clear Clear    pH, UA 7.5 5.0 - 8.0    Specific Gravity, UA 1.006 1.005 - 1.030    Glucose, UA Negative Negative    Ketones, UA Negative Negative    Bilirubin, UA Negative Negative    Blood, UA Negative Negative    Protein, UA Negative Negative    Leuk Esterase, UA Negative Negative    Nitrite, UA Negative Negative    Urobilinogen, UA 0.2 E.U./dL 0.2 - 1.0 E.U./dL   Urine Drug Screen - Urine, Clean Catch    Collection Time: 11/29/23  2:38 PM    Specimen: Urine, Clean Catch   Result Value Ref Range    Amphet/Methamphet, Screen Negative Negative    Barbiturates Screen, Urine Negative Negative    Benzodiazepine Screen, Urine Negative Negative    Cocaine Screen, Urine Negative Negative    Opiate Screen Negative Negative    THC, Screen, Urine Negative Negative    Methadone Screen, Urine Negative Negative    Oxycodone Screen, Urine Negative Negative    Fentanyl, Urine Negative Negative       Ordered the above labs and independently interpreted results. My findings will be discussed in the medical decision making section below        RADIOLOGY  No Radiology Exams Resulted Within Past 24 Hours    Ordered the above noted radiological studies.  Independently interpreted by me and my independent review of findings can be found in the ED Course.  See dictation for official radiology interpretation.      PROCEDURES    Procedures      MEDICATIONS GIVEN IN ER    Medications   lactated ringers bolus 1,000 mL (0 mL Intravenous Stopped 11/29/23 1548)         PROGRESS, DATA ANALYSIS, CONSULTS, AND MEDICAL DECISION MAKING    Please note that this section constitutes my independent  interpretation of clinical data including lab results, radiology, EKG's.  This constitutes my independent professional opinion regarding differential diagnosis and management of this patient.  It may include any factors such as history from outside sources, review of external records, social determinants of health, management of medications, response to those treatments, and discussions with other providers.    Differential Diagnosis and Plan: Initial concern for vasovagal syncope, POTS related episode, alcohol intoxication, dehydration, renal failure, electrolyte abnormalities, arrhythmia, among others.  Plan for labs, supportive care, and reevaluation with results.    Additional sources:  - Discussed/ obtained information from independent historians:       - Chronic or social conditions impacting care: Mother at bedside, patient requesting that she not be fully updated on findings today and was asked to leave the room during updates     - Shared decision making:  Patient fully updated on and in agreement with the course and plan moving forward    ED Course as of 11/29/23 1608   Wed Nov 29, 2023   1359 WBC: 5.08 [DC]   1359 Hemoglobin: 12.8 [DC]   1359 Platelets: 289 [DC]   1408 HCG Qualitative: Negative [DC]   1419 Ethanol %: 0.453 [DC]   1419 Magnesium: 2.2 [DC]   1419 Glucose: 90 [DC]   1419 BUN: 6 [DC]   1419 Creatinine: 0.69 [DC]   1419 Sodium: 145 [DC]   1419 Potassium: 3.9 [DC]   1419 ALT (SGPT)(!): 143 [DC]   1419 AST (SGOT)(!): 148 [DC]   1419 Alkaline Phosphatase: 76 [DC]   1419 Total Bilirubin: 0.2 [DC]      ED Course User Index  [DC] Steve Brown MD     Syncopal episode today likely secondary to heavy alcohol use, she is otherwise hemodynamically stable here in the emergency department.  Parents are here and will take her home.  Was unable to update parents given the patient's request that they not be updated by myself, she would prefer to update them at home as they are unaware of her underlying  alcohol use.    Hospitalization Considered?:     Orders Placed During This Visit:  Orders Placed This Encounter   Procedures    hCG, Serum, Qualitative    Urinalysis With Microscopic If Indicated (No Culture) - Urine, Clean Catch    Ethanol    Urine Drug Screen - Urine, Clean Catch    Magnesium    CBC Auto Differential    Comprehensive Metabolic Panel    ECG 12 Lead Syncope    CBC & Differential       Additional orders considered but not placed:      Independent interpretation of labs, radiology studies, and discussions with consultants: See ED Course        AS OF 16:08 EST VITALS:    BP - 110/76  HR - 90  TEMP - 97.9 °F (36.6 °C) (Oral)  02 SATS - 93%        DIAGNOSIS  Final diagnoses:   Syncope, unspecified syncope type   History of syncope   Alcoholic intoxication without complication   History of alcohol use         DISPOSITION  DISCHARGE    Patient discharged in stable condition.    Reviewed implications of results, diagnosis, meds, responsibility to follow up, warning signs and symptoms of possible worsening, potential complications and reasons to return to ER. If your blood pressure > 120/80 please follow up with your primary care provider for further management.    Patient/Family voiced understanding of above instructions.    Discussed plan for discharge, as there is no emergent indication for admission. Pt/family is agreeable and understands need for follow up and repeat testing.  Pt is aware that discharge does not mean that nothing is wrong but it indicates no emergency is present that requires admission and they must continue care with follow-up as given below or physician of their choice.     FOLLOW-UP  [facility] EMERGENCY DEPARTMENT  [address] [facility] [address] [phone]    As needed, If symptoms worsen    Follow up with your PCP and other medical providers on return home               Medication List      No changes were made to your prescriptions during this  visit.                       --    Please note that portions of this were completed with a voice recognition program.       Note Disclaimer: At [facility], we believe that sharing information builds trust and better relationships. You are receiving this note because you are receiving care at [facility] or recently visited. It is possible you will see health information before a provider has talked with you about it. This kind of information can be easy to misunderstand. To help you fully understand what it means for your health, we urge you to discuss this note with your provider.           Steve Brown MD  11/29/23 9589

## 2023-11-29 NOTE — ED TRIAGE NOTES
[place] via [place]    Pt had syncopal episode. Pt has hx groves. Pt reports passing out often. Pt smell of ETOH. Pt denies any injuries.

## 2023-11-30 LAB
QT INTERVAL: 404 MS
QTC INTERVAL: 492 MS

## 2024-01-15 ENCOUNTER — TELEPHONE (OUTPATIENT)
Dept: CARDIOLOGY | Facility: CLINIC | Age: 24
End: 2024-01-15

## 2024-01-15 ENCOUNTER — TELEPHONE (OUTPATIENT)
Dept: INTERNAL MEDICINE | Facility: CLINIC | Age: 24
End: 2024-01-15

## 2024-05-11 DIAGNOSIS — F41.9 ANXIETY: ICD-10-CM

## 2024-05-13 ENCOUNTER — OFFICE VISIT (OUTPATIENT)
Dept: INTERNAL MEDICINE | Facility: CLINIC | Age: 24
End: 2024-05-13
Payer: COMMERCIAL

## 2024-05-13 ENCOUNTER — HOSPITAL ENCOUNTER (OUTPATIENT)
Dept: CT IMAGING | Facility: HOSPITAL | Age: 24
Discharge: HOME | End: 2024-05-13
Admitting: NURSE PRACTITIONER
Payer: COMMERCIAL

## 2024-05-13 VITALS
HEART RATE: 74 BPM | BODY MASS INDEX: 17.99 KG/M2 | HEIGHT: 65 IN | WEIGHT: 108 LBS | DIASTOLIC BLOOD PRESSURE: 62 MMHG | SYSTOLIC BLOOD PRESSURE: 98 MMHG | OXYGEN SATURATION: 99 %

## 2024-05-13 DIAGNOSIS — Q79.60 EDS (EHLERS-DANLOS SYNDROME): Primary | ICD-10-CM

## 2024-05-13 DIAGNOSIS — R10.32 LLQ ABDOMINAL PAIN: ICD-10-CM

## 2024-05-13 DIAGNOSIS — N83.209 CYST OF OVARY, UNSPECIFIED LATERALITY: ICD-10-CM

## 2024-05-13 DIAGNOSIS — F31.4 BIPOLAR DISORDER, CURRENT EPISODE DEPRESSED, SEVERE, UNSPECIFIED WHETHER PSYCHOTIC FEATURES: ICD-10-CM

## 2024-05-13 DIAGNOSIS — F10.10 ETOH ABUSE: ICD-10-CM

## 2024-05-13 PROCEDURE — 99214 OFFICE O/P EST MOD 30 MIN: CPT | Performed by: NURSE PRACTITIONER

## 2024-05-13 PROCEDURE — 74177 CT ABD & PELVIS W/CONTRAST: CPT

## 2024-05-13 PROCEDURE — 25510000001 IOPAMIDOL 61 % SOLUTION: Performed by: NURSE PRACTITIONER

## 2024-05-13 PROCEDURE — 0 DIATRIZOATE MEGLUMINE & SODIUM PER 1 ML: Performed by: NURSE PRACTITIONER

## 2024-05-13 PROCEDURE — 82565 ASSAY OF CREATININE: CPT

## 2024-05-13 RX ORDER — ATOMOXETINE 40 MG/1
CAPSULE ORAL
COMMUNITY
Start: 2024-03-24

## 2024-05-13 RX ORDER — PROPRANOLOL HYDROCHLORIDE 10 MG/1
TABLET ORAL
COMMUNITY
Start: 2023-11-21

## 2024-05-13 RX ORDER — SERTRALINE HYDROCHLORIDE 25 MG/1
25 TABLET, FILM COATED ORAL DAILY
Qty: 30 TABLET | Refills: 5 | Status: SHIPPED | OUTPATIENT
Start: 2024-05-13 | End: 2024-05-13

## 2024-05-13 RX ADMIN — IOPAMIDOL 85 ML: 612 INJECTION, SOLUTION INTRAVENOUS at 13:14

## 2024-05-13 RX ADMIN — DIATRIZOATE MEGLUMINE AND DIATRIZOATE SODIUM 30 ML: 660; 100 LIQUID ORAL; RECTAL at 12:05

## 2024-05-13 NOTE — PATIENT INSTRUCTIONS
1. EDS- continue with specialists, and agree with therapist   2. eTOH addiction- suggest therapist,   3. Ovarian cysts- will contact GYN for further eval  4. LLQ pain- check CT abd and pelvis today   5. Bipolar disorder- positive mood questionnaire, wean the zoloft, start vraylar 1.5 mg daily, discussed could go up to 3 mg daily but now on medical marijuana so will ease in to dosing. Check pharmaco genomic cheek swab today. She will call insurance to get a list of psych providers.   To ER with severe symptoms or suicidal thoughts, not currently a harm to self or others. No guns in the home.

## 2024-05-13 NOTE — PROGRESS NOTES
Subjective   Marija Hanson is a 24 y.o. female.      History of Present Illness   The patient is here today to F/U on lab work. Wanting to go over lab work.     Has itchy skin, to see allergist today.     Seeing an EDS specialist in [place], has a specialized physical therapist.   Also seeing PT for POTs is trying to find a therapist.   She is trying to stay active. Does use a back brace sometimes. Hips are tight. She has tried dry needling.   They are worried about vascular congestion. She is to going to [facility] for a scan on this.     Seeing a therapist now that she connects with. Just had her first session. Did see someone who was working with EMDR but she found it overwhelming.     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 Also a class for audio visual design. Already has her bachelors.     She is looking for a job. Lives with her boyfriend and lives at home here with parents.   She has weaned down on the zoloft due to brain fog.   She has seen a psychiatrist who wanted her to take more.     She reports LLQ pain, her PT does help her pain.     Known history of ovarian cysts, this was found at the hospital with her kidney stone.     She had stopped drinking ETOH and then had more ETOH over easter. She does not want to go to AA.     She has lost some weight and has gained some weight recently. Her appetite has been not great lately. Tries to eat small frequent meals.   The following portions of the patient's history were reviewed and updated as appropriate: allergies, current medications, past family history, past medical history, past social history, past surgical history and problem list.    Review of Systems   Respiratory: Negative.     Cardiovascular: Negative.    Gastrointestinal:  Positive for abdominal pain.   Musculoskeletal:  Positive for back pain.   Psychiatric/Behavioral:  Positive for depressed mood. Negative for suicidal ideas (she discusses self physical abuse with hitting herself, she  defers doing this recently, she reports emotional self abuse). Sleep disturbance: she discusses self physical abuse with hitting herself, she defers doing this recently, she reports emotional self abuse.The patient is nervous/anxious.        Objective   Physical Exam  Constitutional:       Appearance: Normal appearance. She is well-developed.   Neck:      Thyroid: No thyromegaly.   Cardiovascular:      Rate and Rhythm: Normal rate and regular rhythm.      Heart sounds: Normal heart sounds.   Pulmonary:      Effort: Pulmonary effort is normal.      Breath sounds: Normal breath sounds.   Musculoskeletal:      Cervical back: Normal range of motion and neck supple.   Lymphadenopathy:      Cervical: No cervical adenopathy.   Skin:     General: Skin is warm and dry.   Neurological:      Mental Status: She is alert.   Psychiatric:         Mood and Affect: Affect is labile and tearful.         Behavior: Behavior normal.         Thought Content: Thought content normal.         Judgment: Judgment normal.         Vitals:    05/13/24 1001   BP: 98/62   Pulse: 74   SpO2: 99%     Body mass index is 17.97 kg/m².    Current Outpatient Medications:     atomoxetine (STRATTERA) 40 MG capsule, , Disp: , Rfl:     propranolol (INDERAL) 10 MG tablet, , Disp: , Rfl:     sertraline (ZOLOFT) 50 MG tablet, , Disp: , Rfl:     Cariprazine HCl (Vraylar) 1.5 MG capsule capsule, Take 1 capsule by mouth Daily., Disp: 30 capsule, Rfl: 2     Assessment & Plan   Diagnoses and all orders for this visit:    1. EDS (Hafsa-Danlos syndrome) (Primary)    2. ETOH abuse    3. Cyst of ovary, unspecified laterality    4. Bipolar disorder, current episode depressed, severe, unspecified whether psychotic features  -     Cariprazine HCl (Vraylar) 1.5 MG capsule capsule; Take 1 capsule by mouth Daily.  Dispense: 30 capsule; Refill: 2    5. LLQ abdominal pain  -     CT Abdomen Pelvis With Contrast               1. EDS- continue with specialists, and agree with  therapist   2. eTOH addiction- in recovery, suggest therapist  3. Ovarian cysts- will contact GYN for further eval  4. LLQ pain- check CT abd and pelvis today   5. Bipolar disorder- positive mood questionnaire, wean the zoloft, start vraylar 1.5 mg daily, discussed could go up to 3 mg daily but now on medical marijuana so will ease in to dosing. Check pharmaco genomic cheek swab today. She will call insurance to get a list of psych providers.   To ER with severe symptoms or suicidal thoughts, not currently a harm to self or others. No guns in the home.

## 2024-05-14 LAB — CREAT BLDA-MCNC: 0.6 MG/DL (ref 0.6–1.3)

## 2024-05-30 ENCOUNTER — TELEPHONE (OUTPATIENT)
Dept: INTERNAL MEDICINE | Facility: CLINIC | Age: 24
End: 2024-05-30
Payer: COMMERCIAL

## 2024-05-30 NOTE — TELEPHONE ENCOUNTER
Spoke to Bette Rendon regarding patient her advise is to take it every other day for one week and stop. If she wants to continue to take it every other day and it helps she can stay on it. Bette wants the patient to see her Psychiatrist for the meds. I called and left a message with the mother and informed her we had spoken to the patient. The mother is not on the [facility] Verbal release for our office.

## 2024-05-30 NOTE — TELEPHONE ENCOUNTER
Called patient and provided her with weaning instructions for Vraylar. Patient stated understanding. She said that she sees her psychiatrist on June 10th and will speak to the psychiatrist about taking over future medications for her. Patient will call us in the meantime if she needs anything.

## 2024-05-30 NOTE — TELEPHONE ENCOUNTER
Caller: MATT MARTÍNEZ    Relationship: Mother    Best call back number: [phone]    Which medication are you concerned about: VRAYLAR    Who prescribed you this medication: MAKENNA CASH    When did you start taking this medication: 05/13/24    What are your concerns: PATIENT'S MOM CALLED AND SAID THE PATIENT IS STRUGGLING WITH HER ANGER AND THE VRAYLAR DOESN'T SEEM TO BE HELPING SO SHE IS WANTING TO SEE WHAT MAKENNA CASH WOULD ADVISE, SHE SAID PATIENT IS OUT OF TOWN AND CAN'T BE SEEN AT THE MOMENT     How long have you had these concerns: N/A

## 2024-06-08 ENCOUNTER — HOSPITAL ENCOUNTER (EMERGENCY)
Age: 24
Discharge: PSYCHIATRIC OR SUBSTANCE USE FACILITY | End: 2024-06-08
Attending: EMERGENCY MEDICINE

## 2024-06-08 VITALS
OXYGEN SATURATION: 100 % | DIASTOLIC BLOOD PRESSURE: 67 MMHG | RESPIRATION RATE: 16 BRPM | SYSTOLIC BLOOD PRESSURE: 112 MMHG | HEART RATE: 81 BPM | TEMPERATURE: 97.5 F

## 2024-06-08 DIAGNOSIS — R10.9 ABDOMINAL PAIN, UNSPECIFIED ABDOMINAL LOCATION: ICD-10-CM

## 2024-06-08 DIAGNOSIS — F32.A ANXIETY AND DEPRESSION: Primary | ICD-10-CM

## 2024-06-08 DIAGNOSIS — F41.9 ANXIETY AND DEPRESSION: Primary | ICD-10-CM

## 2024-06-08 LAB
ALBUMIN SERPL-MCNC: 3.7 G/DL (ref 3.6–5.1)
ALBUMIN/GLOB SERPL: 1.1 {RATIO} (ref 1–2.4)
ALP SERPL-CCNC: 64 UNITS/L (ref 45–117)
ALT SERPL-CCNC: 16 UNITS/L
AMPHETAMINES UR QL SCN>500 NG/ML: NEGATIVE
ANION GAP SERPL CALC-SCNC: 4 MMOL/L (ref 7–19)
AST SERPL-CCNC: 13 UNITS/L
BARBITURATES UR QL SCN>200 NG/ML: NEGATIVE
BASOPHILS # BLD: 0.1 K/MCL (ref 0–0.3)
BASOPHILS NFR BLD: 1 %
BENZODIAZ UR QL SCN>200 NG/ML: NEGATIVE
BILIRUB SERPL-MCNC: 0.7 MG/DL (ref 0.2–1)
BUN SERPL-MCNC: 10 MG/DL (ref 6–20)
BUN/CREAT SERPL: 14 (ref 7–25)
BZE UR QL SCN>150 NG/ML: NEGATIVE
CALCIUM SERPL-MCNC: 8.8 MG/DL (ref 8.4–10.2)
CANNABINOIDS UR QL SCN>50 NG/ML: POSITIVE
CHLORIDE SERPL-SCNC: 108 MMOL/L (ref 97–110)
CO2 SERPL-SCNC: 29 MMOL/L (ref 21–32)
CREAT SERPL-MCNC: 0.74 MG/DL (ref 0.51–0.95)
DEPRECATED RDW RBC: 45.8 FL (ref 39–50)
EGFRCR SERPLBLD CKD-EPI 2021: >90 ML/MIN/{1.73_M2}
EOSINOPHIL # BLD: 0.2 K/MCL (ref 0–0.5)
EOSINOPHIL NFR BLD: 3 %
ERYTHROCYTE [DISTWIDTH] IN BLOOD: 12.9 % (ref 11–15)
ETHANOL SERPL-MCNC: NORMAL MG/DL
FASTING DURATION TIME PATIENT: ABNORMAL H
FENTANYL UR QL SCN: NEGATIVE
GLOBULIN SER-MCNC: 3.4 G/DL (ref 2–4)
GLUCOSE SERPL-MCNC: 88 MG/DL (ref 70–99)
HCG SERPL-ACNC: <2 MUNITS/ML
HCT VFR BLD CALC: 40 % (ref 36–46.5)
HGB BLD-MCNC: 13.3 G/DL (ref 12–15.5)
IMM GRANULOCYTES # BLD AUTO: 0 K/MCL (ref 0–0.2)
IMM GRANULOCYTES # BLD: 0 %
LYMPHOCYTES # BLD: 1.5 K/MCL (ref 1–4.8)
LYMPHOCYTES NFR BLD: 15 %
MCH RBC QN AUTO: 32.2 PG (ref 26–34)
MCHC RBC AUTO-ENTMCNC: 33.3 G/DL (ref 32–36.5)
MCV RBC AUTO: 96.9 FL (ref 78–100)
MONOCYTES # BLD: 0.5 K/MCL (ref 0.3–0.9)
MONOCYTES NFR BLD: 5 %
NEUTROPHILS # BLD: 7.5 K/MCL (ref 1.8–7.7)
NEUTROPHILS NFR BLD: 76 %
NRBC BLD MANUAL-RTO: 0 /100 WBC
OPIATES UR QL SCN>300 NG/ML: NEGATIVE
PCP UR QL SCN>25 NG/ML: NEGATIVE
PLATELET # BLD AUTO: 382 K/MCL (ref 140–450)
POTASSIUM SERPL-SCNC: 3.9 MMOL/L (ref 3.4–5.1)
PROT SERPL-MCNC: 7.1 G/DL (ref 6.4–8.2)
RBC # BLD: 4.13 MIL/MCL (ref 4–5.2)
SODIUM SERPL-SCNC: 137 MMOL/L (ref 135–145)
WBC # BLD: 9.8 K/MCL (ref 4.2–11)

## 2024-06-08 PROCEDURE — 99285 EMERGENCY DEPT VISIT HI MDM: CPT | Performed by: EMERGENCY MEDICINE

## 2024-06-08 PROCEDURE — 82077 ASSAY SPEC XCP UR&BREATH IA: CPT

## 2024-06-08 PROCEDURE — 80053 COMPREHEN METABOLIC PANEL: CPT

## 2024-06-08 PROCEDURE — 84702 CHORIONIC GONADOTROPIN TEST: CPT | Performed by: EMERGENCY MEDICINE

## 2024-06-08 PROCEDURE — 10002803 HB RX 637: Performed by: EMERGENCY MEDICINE

## 2024-06-08 PROCEDURE — 36415 COLL VENOUS BLD VENIPUNCTURE: CPT

## 2024-06-08 PROCEDURE — 99285 EMERGENCY DEPT VISIT HI MDM: CPT

## 2024-06-08 PROCEDURE — 85025 COMPLETE CBC W/AUTO DIFF WBC: CPT

## 2024-06-08 PROCEDURE — 10002803 HB RX 637

## 2024-06-08 PROCEDURE — 80307 DRUG TEST PRSMV CHEM ANLYZR: CPT

## 2024-06-08 PROCEDURE — 90839 PSYTX CRISIS INITIAL 60 MIN: CPT

## 2024-06-08 RX ORDER — LORAZEPAM 0.5 MG/1
0.5 TABLET ORAL ONCE
Status: COMPLETED | OUTPATIENT
Start: 2024-06-08 | End: 2024-06-08

## 2024-06-08 RX ORDER — LORAZEPAM 1 MG/1
1 TABLET ORAL ONCE
Status: DISCONTINUED | OUTPATIENT
Start: 2024-06-08 | End: 2024-06-08

## 2024-06-08 RX ORDER — LORAZEPAM 1 MG/1
1 TABLET ORAL ONCE
Status: COMPLETED | OUTPATIENT
Start: 2024-06-08 | End: 2024-06-08

## 2024-06-08 RX ADMIN — LORAZEPAM 0.5 MG: 0.5 TABLET ORAL at 15:24

## 2024-06-08 RX ADMIN — LORAZEPAM 1 MG: 1 TABLET ORAL at 16:22

## 2024-06-08 SDOH — ECONOMIC STABILITY: HOUSING INSECURITY: WHAT IS YOUR LIVING SITUATION TODAY?: I HAVE A STEADY PLACE TO LIVE

## 2024-06-08 SDOH — ECONOMIC STABILITY: FOOD INSECURITY: WITHIN THE PAST 12 MONTHS, THE FOOD YOU BOUGHT JUST DIDN'T LAST AND YOU DIDN'T HAVE MONEY TO GET MORE.: NEVER TRUE

## 2024-06-08 SDOH — ECONOMIC STABILITY: GENERAL

## 2024-06-08 SDOH — ECONOMIC STABILITY: HOUSING INSECURITY: DO YOU HAVE PROBLEMS WITH ANY OF THE FOLLOWING?: NONE OF THE ABOVE

## 2024-06-08 SDOH — ECONOMIC STABILITY: TRANSPORTATION INSECURITY
IN THE PAST 12 MONTHS, HAS LACK OF RELIABLE TRANSPORTATION KEPT YOU FROM MEDICAL APPOINTMENTS, MEETINGS, WORK OR FROM GETTING THINGS NEEDED FOR DAILY LIVING?: NO

## 2024-06-08 SDOH — SOCIAL STABILITY: SOCIAL NETWORK
HOW OFTEN DO YOU SEE OR TALK TO PEOPLE THAT YOU CARE ABOUT AND FEEL CLOSE TO? (FOR EXAMPLE: TALKING TO FRIENDS ON THE PHONE, VISITING FRIENDS OR FAMILY, GOING TO CHURCH OR CLUB MEETINGS): 5 OR MORE TIMES A WEEK

## 2024-06-08 ASSESSMENT — COLUMBIA-SUICIDE SEVERITY RATING SCALE - C-SSRS
TOTAL  NUMBER OF INTERRUPTED ATTEMPTS LIFETIME: YES
6. HAVE YOU EVER DONE ANYTHING, STARTED TO DO ANYTHING, OR PREPARED TO DO ANYTHING TO END YOUR LIFE?: NO
6. HAVE YOU EVER DONE ANYTHING, STARTED TO DO ANYTHING, OR PREPARED TO DO ANYTHING TO END YOUR LIFE?: NO
ATTEMPT LIFETIME: NO
TOTAL  NUMBER OF ABORTED OR SELF INTERRUPTED ATTEMPTS PAST 3 MONTHS: NO
REASONS FOR IDEATION LIFETIME: COMPLETELY TO END OR STOP THE PAIN (YOU COULDN'T GO ON LIVING WITH THE PAIN OR HOW YOU WERE FEELING)
ATTEMPT PAST THREE MONTHS: NO
TOTAL  NUMBER OF INTERRUPTED ATTEMPTS PAST 3 MONTHS: NO
REASONS FOR IDEATION PAST MONTH: COMPLETELY TO END OR STOP THE PAIN (YOU COULDN'T GO ON LIVING WITH THE PAIN OR HOW YOU WERE FEELING)

## 2024-06-08 ASSESSMENT — COGNITIVE AND FUNCTIONAL STATUS - GENERAL
PERCEPTUAL_MISINTERPRETATIONS_HALLUCINATIONS: CLEAR REALITY BASED PERCEPTIONS
ATTENTION_CALCULATED: MAINTAINS ATTENTION
MOOD: LABILE
MOTOR_BEHAVIOR-AGITATION_CALCULATED: 1;2;6
LEVEL_OF_CONSCIOUSNESS_CALCULATED: ALERT
SPEECH: CLEAR/UNDERSTANDABLE
MOTOR_BEHAVIOR-RETARDATION_CALCULATED: CALM AND PURPOSEFUL
ORIENTATION: ORIENTED TO PERSON;ORIENTED TO PLACE;ORIENTED TO TIME
MEMORY: INTACT

## 2024-06-08 ASSESSMENT — PAIN SCALES - GENERAL: PAINLEVEL_OUTOF10: 5

## 2024-06-08 ASSESSMENT — LIFESTYLE VARIABLES
HOW OFTEN DO YOU HAVE A DRINK CONTAINING ALCOHOL: NEVER
ALCOHOL_USE: DENIES
HOW OFTEN DO YOU HAVE 6 OR MORE DRINKS ON ONE OCCASION: NEVER
AUDIT-C TOTAL SCORE: 0
HOW MANY STANDARD DRINKS CONTAINING ALCOHOL DO YOU HAVE ON A TYPICAL DAY: 0,1 OR 2
ALCOHOL_USE_STATUS: NO OR LOW RISK WITH VALIDATED TOOL

## 2024-06-09 PROBLEM — F39 MOOD DISORDER (HCC): Status: ACTIVE | Noted: 2024-06-09

## 2024-06-20 ENCOUNTER — TELEPHONE (OUTPATIENT)
Dept: INTERNAL MEDICINE | Facility: CLINIC | Age: 24
End: 2024-06-20

## 2024-06-20 NOTE — TELEPHONE ENCOUNTER
Caller: MATT MARTÍNEZ    Relationship: Mother    Best call back number: [phone]     What form or medical record are you requesting: GENESIGHT RESULTS 05-    Who is requesting this form or medical record from you: [facility] [place] [facility]    How would you like to receive the form or medical records (pick-up, mail, fax): FAX  If fax, what is the fax number: [phone]    Timeframe paperwork needed: ASAP    Additional notes: PATIENTS MOTHER STATED THE PATIENT IS CURRENTLY IN THE [facility] FAC[facility] IN [facility].    PATIENTS MOTHER STATED THE PATIENT HAS REFUSED TO TAKE LITHIUM, AND [facility] ARE TRYING TO FIGURE OUT WHAT TO GIVE THE PATIENT.    PATIENTS MOTHER STATED [facility] DO NOT HAVE THE PATIENTS RECORDS FROM [facility] TESTING RESULTS, AND STATED SHE WAS ADVISED THIS WOULD HELP THE NURSING STAFF TO TREAT THE PATIENT.    PATIENTS MOTHER WAS ADVISED TO HAVE THESE RECORDS FAXED TO THE NURSING STATION AT  THE NUMBER LISTED.    PATIENTS MOTHER STATED SHE HAS BEEN SPEAKING WITH KEKE AT [facility], AND STATED THE NURSE STATION PHONE NUMBER [phone]     PATIENTS MOTHER STATED THERE IS NOT A DIRECT LINE TO REACH THE PATIENT, AND THAT THE NURSES STATION MUST BE CALLED TO REACH HER.

## 2024-06-24 NOTE — TELEPHONE ENCOUNTER
Caller: MATT MARTÍNEZ    Relationship: Mother    Best call back number: [phone]     What is the best time to reach you: ASAP    Who are you requesting to speak with (clinical staff, provider,  specific staff member): ANY    What was the call regarding: PATIENT'S MOTHER IS REQUESTING A CALL BACK TO BE ADVISED IF MEDICAL RECORDS REQUEST WAS RECEIVED. MATT STATES THOSE RECORDS ARE NEEDED ASAP     Is it okay if the provider responds through MyChart: NO

## 2024-07-11 ENCOUNTER — TELEPHONE (OUTPATIENT)
Dept: INTERNAL MEDICINE | Facility: CLINIC | Age: 24
End: 2024-07-11
Payer: COMMERCIAL

## 2024-07-11 NOTE — TELEPHONE ENCOUNTER
Caller: KIM ELIZONDO    Relationship: Emergency Contact    Best call back number:     What is the best time to reach you:     Who are you requesting to speak with (clinical staff, provider,  specific staff member): DR CASH OR STAFF    Do you know the name of the person who called:     What was the call regarding: PATIENTS BOYFRIEND IS CALLING IN TRYING TO SCHEDULE A MYCHART FOR THE PATIENT BUT SHE IS IN [place].  I INFORMED HIM THAT THIS COULD NOT BE SCHEDULED AS SHE IS OUT OF STATE.  KIM OR PATIENT JANETH WANT TO BE CALLED AS THEY WANT TO DISCUSS A MEDICATION THAT THE PATIENT IS CURRENTLY TAKING.      Is it okay if the provider responds through MyChart:

## 2024-07-12 NOTE — TELEPHONE ENCOUNTER
Called and spoke to boyfriend they are going to try to find a mental health provider in the area that can help with the medication she is taking.

## 2024-07-30 ENCOUNTER — TELEPHONE (OUTPATIENT)
Age: 24
End: 2024-07-30

## 2024-07-30 NOTE — TELEPHONE ENCOUNTER
[facility]  [address] [place] [address]  Phone: [phone] or [phone]    [facility] [address]  [place] [address]  Phone: [phone]    [facility]  [address] [place] [address]  Phone: [phone]    David Leary,    [facility] Navigation  [phone]